# Patient Record
Sex: FEMALE | Race: WHITE | NOT HISPANIC OR LATINO | Employment: OTHER | URBAN - METROPOLITAN AREA
[De-identification: names, ages, dates, MRNs, and addresses within clinical notes are randomized per-mention and may not be internally consistent; named-entity substitution may affect disease eponyms.]

---

## 2022-08-10 ENCOUNTER — HOSPITAL ENCOUNTER (OUTPATIENT)
Facility: MEDICAL CENTER | Age: 77
End: 2022-08-10
Attending: ORTHOPAEDIC SURGERY
Payer: MEDICARE

## 2022-08-10 LAB
APPEARANCE FLD: NORMAL
BODY FLD TYPE: NORMAL
COLOR FLD: NORMAL
GRAM STN SPEC: NORMAL
HISTIOCYTES NFR FLD: 15 %
LYMPHOCYTES NFR FLD: 14 %
MONONUC CELLS NFR FLD: 71 %
RBC # FLD: NORMAL CELLS/UL
SIGNIFICANT IND 70042: NORMAL
SITE SITE: NORMAL
SOURCE SOURCE: NORMAL
WBC # FLD: 3130 CELLS/UL
WBC OTHER NFR FLD: 0 %

## 2022-08-10 PROCEDURE — 89051 BODY FLUID CELL COUNT: CPT

## 2022-08-10 PROCEDURE — 87070 CULTURE OTHR SPECIMN AEROBIC: CPT

## 2022-08-10 PROCEDURE — 87205 SMEAR GRAM STAIN: CPT

## 2022-08-13 LAB
BACTERIA FLD AEROBE CULT: NORMAL
GRAM STN SPEC: NORMAL
SIGNIFICANT IND 70042: NORMAL
SITE SITE: NORMAL
SOURCE SOURCE: NORMAL

## 2023-04-27 PROBLEM — T84.093A FAILED TOTAL LEFT KNEE REPLACEMENT (HCC): Status: ACTIVE | Noted: 2023-04-27

## 2023-04-28 ENCOUNTER — APPOINTMENT (OUTPATIENT)
Dept: ADMISSIONS | Facility: MEDICAL CENTER | Age: 78
DRG: 467 | End: 2023-04-28
Attending: ORTHOPAEDIC SURGERY
Payer: MEDICARE

## 2023-05-17 ENCOUNTER — APPOINTMENT (OUTPATIENT)
Dept: ADMISSIONS | Facility: MEDICAL CENTER | Age: 78
DRG: 467 | End: 2023-05-17
Attending: ORTHOPAEDIC SURGERY
Payer: MEDICARE

## 2023-05-17 VITALS — WEIGHT: 117 LBS | HEIGHT: 67 IN | BODY MASS INDEX: 18.36 KG/M2

## 2023-05-17 ASSESSMENT — FIBROSIS 4 INDEX: FIB4 SCORE: 2.35

## 2023-05-17 NOTE — PREPROCEDURE INSTRUCTIONS
Pt preadmitted via phone, instructions emailed. Questions answered. Pt instructed to continue regularly prescribed meds through day of procedure. Per anesthesia protocol instructed to take these medications the day of procedure- N/A. METs score >4. Placed on fall risk precautions per score.

## 2023-05-18 ENCOUNTER — PRE-ADMISSION TESTING (OUTPATIENT)
Dept: ADMISSIONS | Facility: MEDICAL CENTER | Age: 78
DRG: 467 | End: 2023-05-18
Attending: ORTHOPAEDIC SURGERY
Payer: MEDICARE

## 2023-05-18 DIAGNOSIS — Z01.812 PRE-OPERATIVE LABORATORY EXAMINATION: ICD-10-CM

## 2023-05-18 DIAGNOSIS — Z01.810 PRE-OPERATIVE CARDIOVASCULAR EXAMINATION: ICD-10-CM

## 2023-05-18 DIAGNOSIS — Z01.818 PRE-OP EXAMINATION: ICD-10-CM

## 2023-05-18 LAB
ANION GAP SERPL CALC-SCNC: 12 MMOL/L (ref 7–16)
BUN SERPL-MCNC: 18 MG/DL (ref 8–22)
CALCIUM SERPL-MCNC: 9.5 MG/DL (ref 8.4–10.2)
CHLORIDE SERPL-SCNC: 104 MMOL/L (ref 96–112)
CO2 SERPL-SCNC: 25 MMOL/L (ref 20–33)
CREAT SERPL-MCNC: 0.62 MG/DL (ref 0.5–1.4)
ERYTHROCYTE [DISTWIDTH] IN BLOOD BY AUTOMATED COUNT: 48.6 FL (ref 35.9–50)
EST. AVERAGE GLUCOSE BLD GHB EST-MCNC: 105 MG/DL
GFR SERPLBLD CREATININE-BSD FMLA CKD-EPI: 91 ML/MIN/1.73 M 2
GLUCOSE SERPL-MCNC: 92 MG/DL (ref 65–99)
HBA1C MFR BLD: 5.3 % (ref 4–5.6)
HCT VFR BLD AUTO: 39.8 % (ref 37–47)
HGB BLD-MCNC: 13.5 G/DL (ref 12–16)
MCH RBC QN AUTO: 32.1 PG (ref 27–33)
MCHC RBC AUTO-ENTMCNC: 33.9 G/DL (ref 33.6–35)
MCV RBC AUTO: 94.8 FL (ref 81.4–97.8)
PLATELET # BLD AUTO: 233 K/UL (ref 164–446)
PMV BLD AUTO: 10.1 FL (ref 9–12.9)
POTASSIUM SERPL-SCNC: 3.7 MMOL/L (ref 3.6–5.5)
RBC # BLD AUTO: 4.2 M/UL (ref 4.2–5.4)
SCCMEC + MECA PNL NOSE NAA+PROBE: NEGATIVE
SCCMEC + MECA PNL NOSE NAA+PROBE: NEGATIVE
SODIUM SERPL-SCNC: 141 MMOL/L (ref 135–145)
WBC # BLD AUTO: 5.3 K/UL (ref 4.8–10.8)

## 2023-05-18 PROCEDURE — 36415 COLL VENOUS BLD VENIPUNCTURE: CPT

## 2023-05-18 PROCEDURE — 85027 COMPLETE CBC AUTOMATED: CPT

## 2023-05-18 PROCEDURE — 83036 HEMOGLOBIN GLYCOSYLATED A1C: CPT

## 2023-05-18 PROCEDURE — 80048 BASIC METABOLIC PNL TOTAL CA: CPT

## 2023-05-18 PROCEDURE — 87641 MR-STAPH DNA AMP PROBE: CPT

## 2023-05-18 PROCEDURE — 87640 STAPH A DNA AMP PROBE: CPT | Mod: XU

## 2023-05-18 NOTE — DISCHARGE PLANNING
DISCHARGE PLANNING NOTE - TOTAL JOINT    Procedure: Procedure(s):  LEFT TOTAL KNEE ARTHROPLASTY REVISION ALL COMPONENTS  Procedure Date: 5/22/2023  Insurance: Payor: MEDICARE / Plan: MEDICARE PART A & B / Product Type: *No Product type* /    Equipment currently available at home? Shower chair, ice, fww, toilet seat riser.  Steps into the home? 2  Steps within the home? 0  Toilet height? ADA  Type of shower? walk-in shower  Who will be with you during your recovery? other: one of her neighbors.  Is Outpatient Physical Therapy set up after surgery? Yes in process  Did you take the Total Joint Class and where? Yes received NAON book today.  Planning same day discharge?No     This writer met with pt and her friend, Tiffanie,  during her preadmission appt. Pt educated to preop showers and nasal mrsa swab. Pt prefers to stay over one night. Home safety checklist r given to pt. Pt educated to dc criteria. All questions answered and verbalizes understanding of all instructions. No dc needs identified at this time. Anticipate dc to home without barriers.

## 2023-05-18 NOTE — H&P (VIEW-ONLY)
Sarah Burger is a 77 y.o. female  here today for further discussion of their unstable left knee replacement and left knee pain they have tried and failed most all conservative measures and are ready to move forward with surgery in the form of a revision of the left total knee arthroplasty.  They have obtained the appropriate clearances if necessary.  Patient has been seen and cleared by her primary care provider.  They have pain on a daily basis, pain at night, pain that affects her activities of daily living.  They have no history of blood clots.  They are ready to move forward with surgery.  We are planning on using [employer] triathlon stemmed and constrained-TS as their implants.  They will be having their surgery at [facility].  She had an ESR and CRP done which was within normal limits.    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- goes to [facility]    Pain 05/2023    L knee pain    PONV (postoperative nausea and vomiting)     Type II or unspecified type diabetes mellitus without mention of complication, not stated as uncontrolled     type II, pt states pre     Past Surgical History:   Procedure Laterality Date    PARATHYROIDECTOMY  01/01/2000    [facility]-1 removed    LUMPECTOMY Bilateral 01/01/1994    breast    CATARACT EXTRACTION WITH IOL Bilateral     KNEE ARTHROPLASTY TOTAL Left     KNEE ARTHROTOMY Left     SINUSOTOMIES       Social Connections: Not on file       Current Outpatient Medications:     meloxicam, 15 mg, Oral, DAILY (Patient not taking: Reported on 5/17/2023)    potassium chloride SA, 10 mEq, Oral, DAILY    indapamide, 2.5 mg, Oral, QAM    metFORMIN, 500 mg, Oral, DAILY  Patient has no known allergies.      Past medical history  social history surgical history review of systems reviewed and is otherwise noncontributory except for above    Exam    Alert and oriented x3, no apparent distress  Normocephalic atraumatic, trachea midline  Breathing is nonlabored  Abdomen is nonacute  Neurovascularly intact in bilateral lower extremities.  Soft Calf Compartments without signs of DVT.  Right lower extremity: She has a varus deformity of her right knee.  Range of motion from 5 to 120 degrees.  She has pain with forced flexion and patellofemoral grind, pain with palpation of her tibiofemoral joint line good stability to varus and valgus stress, grossly neurovascularly intact distally  Left lower extremity: Well-healed curvilinear incision consistent with her knee replacement.  She has range of motion from 5 to approximately 115 degrees.  She has some pain with forced flexion, some laxity to valgus stress and pain along the course of her MCL.  She has pain over the lateral facet of her patella.  She is able to plantar and dorsiflex her ankles and extend her great toes.  She has pain and feelings of instability with single leg stance.    Images    All images were reviewed which are appropriate for templating purposes.  They show a cemented posterior stabilized Conformis left total knee arthroplasty with components in expected position and there may be some radiolucencies around the femoral and tibial component consistent with loosening      Impression    1) left knee pain around a Conformis left total knee arthroplasty  2) left knee instability    Plan    All the risk benefits and side effects of surgery were discussed including pain, bleeding, infection, injury to surrounding organs vessels, heart attack, stroke, fracture, dislocation, need for further surgery, and possibly death.  We will plan on going forward with a revision left total knee arthroplasty.  We will plan on using aspirin for DVT prophylaxis.  Patient will get all of their postoperative  prescriptions today.  Patient will plan on going home the following day.  Patient is asked about the option of going to a rehab facility which I have told her we would recommend against if she is able to get around without difficulty.  We will manage and assess her symptoms postoperatively to see if this is necessary.  We will plan on using [place] triathlon stemmed and constrained revision components as their surgical implants.  All of their questions were answered and they agreed expressed full understanding.  We will see them on the day of surgery.  They will get a call from our surgical team about start time and arrival time of surgery.

## 2023-05-19 ENCOUNTER — ANESTHESIA EVENT (OUTPATIENT)
Dept: SURGERY | Facility: MEDICAL CENTER | Age: 78
DRG: 467 | End: 2023-05-19
Payer: MEDICARE

## 2023-05-22 ENCOUNTER — ANESTHESIA (OUTPATIENT)
Dept: SURGERY | Facility: MEDICAL CENTER | Age: 78
DRG: 467 | End: 2023-05-22
Payer: MEDICARE

## 2023-05-22 ENCOUNTER — APPOINTMENT (OUTPATIENT)
Dept: RADIOLOGY | Facility: MEDICAL CENTER | Age: 78
DRG: 467 | End: 2023-05-22
Attending: PHYSICIAN ASSISTANT
Payer: MEDICARE

## 2023-05-22 ENCOUNTER — HOSPITAL ENCOUNTER (INPATIENT)
Facility: MEDICAL CENTER | Age: 78
LOS: 2 days | DRG: 467 | End: 2023-05-24
Attending: ORTHOPAEDIC SURGERY | Admitting: ORTHOPAEDIC SURGERY
Payer: MEDICARE

## 2023-05-22 DIAGNOSIS — T84.093A FAILED TOTAL LEFT KNEE REPLACEMENT, INITIAL ENCOUNTER (HCC): ICD-10-CM

## 2023-05-22 LAB — GLUCOSE BLD STRIP.AUTO-MCNC: 90 MG/DL (ref 65–99)

## 2023-05-22 PROCEDURE — 160035 HCHG PACU - 1ST 60 MINS PHASE I: Performed by: ORTHOPAEDIC SURGERY

## 2023-05-22 PROCEDURE — 700102 HCHG RX REV CODE 250 W/ 637 OVERRIDE(OP): Performed by: ANESTHESIOLOGY

## 2023-05-22 PROCEDURE — 700111 HCHG RX REV CODE 636 W/ 250 OVERRIDE (IP): Performed by: ANESTHESIOLOGY

## 2023-05-22 PROCEDURE — 97607 NEG PRS WND THR NDME<=50SQCM: CPT | Mod: ASROC,LT | Performed by: PHYSICIAN ASSISTANT

## 2023-05-22 PROCEDURE — 160031 HCHG SURGERY MINUTES - 1ST 30 MINS LEVEL 5: Performed by: ORTHOPAEDIC SURGERY

## 2023-05-22 PROCEDURE — 700101 HCHG RX REV CODE 250: Performed by: ORTHOPAEDIC SURGERY

## 2023-05-22 PROCEDURE — 3E0T3BZ INTRODUCTION OF ANESTHETIC AGENT INTO PERIPHERAL NERVES AND PLEXI, PERCUTANEOUS APPROACH: ICD-10-PCS | Performed by: ORTHOPAEDIC SURGERY

## 2023-05-22 PROCEDURE — 700102 HCHG RX REV CODE 250 W/ 637 OVERRIDE(OP): Performed by: PHYSICIAN ASSISTANT

## 2023-05-22 PROCEDURE — 700111 HCHG RX REV CODE 636 W/ 250 OVERRIDE (IP): Performed by: ORTHOPAEDIC SURGERY

## 2023-05-22 PROCEDURE — 64447 NJX AA&/STRD FEMORAL NRV IMG: CPT | Mod: 59,LT | Performed by: ANESTHESIOLOGY

## 2023-05-22 PROCEDURE — 99100 ANES PT EXTEME AGE<1 YR&>70: CPT | Performed by: ANESTHESIOLOGY

## 2023-05-22 PROCEDURE — 73560 X-RAY EXAM OF KNEE 1 OR 2: CPT | Mod: LT

## 2023-05-22 PROCEDURE — 700105 HCHG RX REV CODE 258: Performed by: PHYSICIAN ASSISTANT

## 2023-05-22 PROCEDURE — 27487 REVISE/REPLACE KNEE JOINT: CPT | Mod: LT | Performed by: ORTHOPAEDIC SURGERY

## 2023-05-22 PROCEDURE — 700111 HCHG RX REV CODE 636 W/ 250 OVERRIDE (IP): Performed by: PHYSICIAN ASSISTANT

## 2023-05-22 PROCEDURE — 0SPD0JZ REMOVAL OF SYNTHETIC SUBSTITUTE FROM LEFT KNEE JOINT, OPEN APPROACH: ICD-10-PCS | Performed by: ORTHOPAEDIC SURGERY

## 2023-05-22 PROCEDURE — 01402 ANES OPN/ARTH TOT KNE ARTHRP: CPT | Performed by: ANESTHESIOLOGY

## 2023-05-22 PROCEDURE — 160009 HCHG ANES TIME/MIN: Performed by: ORTHOPAEDIC SURGERY

## 2023-05-22 PROCEDURE — 160042 HCHG SURGERY MINUTES - EA ADDL 1 MIN LEVEL 5: Performed by: ORTHOPAEDIC SURGERY

## 2023-05-22 PROCEDURE — A9270 NON-COVERED ITEM OR SERVICE: HCPCS | Performed by: PHYSICIAN ASSISTANT

## 2023-05-22 PROCEDURE — A9270 NON-COVERED ITEM OR SERVICE: HCPCS | Performed by: ANESTHESIOLOGY

## 2023-05-22 PROCEDURE — 0QBF0ZZ EXCISION OF LEFT PATELLA, OPEN APPROACH: ICD-10-PCS | Performed by: ORTHOPAEDIC SURGERY

## 2023-05-22 PROCEDURE — 160036 HCHG PACU - EA ADDL 30 MINS PHASE I: Performed by: ORTHOPAEDIC SURGERY

## 2023-05-22 PROCEDURE — 0SRD0J9 REPLACEMENT OF LEFT KNEE JOINT WITH SYNTHETIC SUBSTITUTE, CEMENTED, OPEN APPROACH: ICD-10-PCS | Performed by: ORTHOPAEDIC SURGERY

## 2023-05-22 PROCEDURE — C1776 JOINT DEVICE (IMPLANTABLE): HCPCS | Performed by: ORTHOPAEDIC SURGERY

## 2023-05-22 PROCEDURE — 502240 HCHG MISC OR SUPPLY RC 0272: Performed by: ORTHOPAEDIC SURGERY

## 2023-05-22 PROCEDURE — 97607 NEG PRS WND THR NDME<=50SQCM: CPT | Performed by: ORTHOPAEDIC SURGERY

## 2023-05-22 PROCEDURE — 502000 HCHG MISC OR IMPLANTS RC 0278: Performed by: ORTHOPAEDIC SURGERY

## 2023-05-22 PROCEDURE — 770001 HCHG ROOM/CARE - MED/SURG/GYN PRIV*

## 2023-05-22 PROCEDURE — 160002 HCHG RECOVERY MINUTES (STAT): Performed by: ORTHOPAEDIC SURGERY

## 2023-05-22 PROCEDURE — C1713 ANCHOR/SCREW BN/BN,TIS/BN: HCPCS | Performed by: ORTHOPAEDIC SURGERY

## 2023-05-22 PROCEDURE — 27487 REVISE/REPLACE KNEE JOINT: CPT | Mod: ASROC,LT | Performed by: PHYSICIAN ASSISTANT

## 2023-05-22 PROCEDURE — 700101 HCHG RX REV CODE 250: Performed by: ANESTHESIOLOGY

## 2023-05-22 PROCEDURE — 700105 HCHG RX REV CODE 258: Performed by: ORTHOPAEDIC SURGERY

## 2023-05-22 PROCEDURE — 94760 N-INVAS EAR/PLS OXIMETRY 1: CPT

## 2023-05-22 PROCEDURE — 64447 NJX AA&/STRD FEMORAL NRV IMG: CPT | Performed by: ORTHOPAEDIC SURGERY

## 2023-05-22 PROCEDURE — 160048 HCHG OR STATISTICAL LEVEL 1-5: Performed by: ORTHOPAEDIC SURGERY

## 2023-05-22 PROCEDURE — 82962 GLUCOSE BLOOD TEST: CPT

## 2023-05-22 DEVICE — CEMENT ORTHOPEDIC HV US  (10/PK): Type: IMPLANTABLE DEVICE | Site: KNEE | Status: FUNCTIONAL

## 2023-05-22 DEVICE — IMPLANTABLE DEVICE: Type: IMPLANTABLE DEVICE | Site: KNEE | Status: FUNCTIONAL

## 2023-05-22 RX ORDER — ONDANSETRON 2 MG/ML
4 INJECTION INTRAMUSCULAR; INTRAVENOUS EVERY 4 HOURS PRN
Status: DISCONTINUED | OUTPATIENT
Start: 2023-05-22 | End: 2023-05-24 | Stop reason: HOSPADM

## 2023-05-22 RX ORDER — DEXAMETHASONE SODIUM PHOSPHATE 4 MG/ML
INJECTION, SOLUTION INTRA-ARTICULAR; INTRALESIONAL; INTRAMUSCULAR; INTRAVENOUS; SOFT TISSUE PRN
Status: DISCONTINUED | OUTPATIENT
Start: 2023-05-22 | End: 2023-05-22 | Stop reason: SURG

## 2023-05-22 RX ORDER — SODIUM CHLORIDE 9 MG/ML
INJECTION, SOLUTION INTRAMUSCULAR; INTRAVENOUS; SUBCUTANEOUS
Status: DISCONTINUED | OUTPATIENT
Start: 2023-05-22 | End: 2023-05-22 | Stop reason: HOSPADM

## 2023-05-22 RX ORDER — ACETAMINOPHEN 325 MG/1
650 TABLET ORAL EVERY 6 HOURS PRN
Status: DISCONTINUED | OUTPATIENT
Start: 2023-05-27 | End: 2023-05-24 | Stop reason: HOSPADM

## 2023-05-22 RX ORDER — DIPHENHYDRAMINE HYDROCHLORIDE 50 MG/ML
12.5 INJECTION INTRAMUSCULAR; INTRAVENOUS
Status: DISCONTINUED | OUTPATIENT
Start: 2023-05-22 | End: 2023-05-22 | Stop reason: HOSPADM

## 2023-05-22 RX ORDER — LIDOCAINE HYDROCHLORIDE 20 MG/ML
INJECTION, SOLUTION EPIDURAL; INFILTRATION; INTRACAUDAL; PERINEURAL PRN
Status: DISCONTINUED | OUTPATIENT
Start: 2023-05-22 | End: 2023-05-22 | Stop reason: SURG

## 2023-05-22 RX ORDER — HYDROMORPHONE HYDROCHLORIDE 1 MG/ML
0.1 INJECTION, SOLUTION INTRAMUSCULAR; INTRAVENOUS; SUBCUTANEOUS
Status: DISCONTINUED | OUTPATIENT
Start: 2023-05-22 | End: 2023-05-22 | Stop reason: HOSPADM

## 2023-05-22 RX ORDER — DIPHENHYDRAMINE HCL 25 MG
25 TABLET ORAL NIGHTLY PRN
Status: DISCONTINUED | OUTPATIENT
Start: 2023-05-23 | End: 2023-05-24 | Stop reason: HOSPADM

## 2023-05-22 RX ORDER — IBUPROFEN 400 MG/1
400 TABLET ORAL 3 TIMES DAILY PRN
Status: DISCONTINUED | OUTPATIENT
Start: 2023-05-25 | End: 2023-05-24 | Stop reason: HOSPADM

## 2023-05-22 RX ORDER — SODIUM CHLORIDE, SODIUM LACTATE, POTASSIUM CHLORIDE, CALCIUM CHLORIDE 600; 310; 30; 20 MG/100ML; MG/100ML; MG/100ML; MG/100ML
INJECTION, SOLUTION INTRAVENOUS CONTINUOUS
Status: ACTIVE | OUTPATIENT
Start: 2023-05-22 | End: 2023-05-22

## 2023-05-22 RX ORDER — ROCURONIUM BROMIDE 10 MG/ML
INJECTION, SOLUTION INTRAVENOUS PRN
Status: DISCONTINUED | OUTPATIENT
Start: 2023-05-22 | End: 2023-05-22 | Stop reason: SURG

## 2023-05-22 RX ORDER — DEXTROSE MONOHYDRATE, SODIUM CHLORIDE, AND POTASSIUM CHLORIDE 50; 1.49; 4.5 G/1000ML; G/1000ML; G/1000ML
INJECTION, SOLUTION INTRAVENOUS CONTINUOUS
Status: ACTIVE | OUTPATIENT
Start: 2023-05-22 | End: 2023-05-22

## 2023-05-22 RX ORDER — TRANEXAMIC ACID 100 MG/ML
1000 INJECTION, SOLUTION INTRAVENOUS ONCE
Status: DISCONTINUED | OUTPATIENT
Start: 2023-05-22 | End: 2023-05-22 | Stop reason: HOSPADM

## 2023-05-22 RX ORDER — HYDROMORPHONE HYDROCHLORIDE 2 MG/ML
INJECTION, SOLUTION INTRAMUSCULAR; INTRAVENOUS; SUBCUTANEOUS PRN
Status: DISCONTINUED | OUTPATIENT
Start: 2023-05-22 | End: 2023-05-22 | Stop reason: SURG

## 2023-05-22 RX ORDER — BISACODYL 10 MG
10 SUPPOSITORY, RECTAL RECTAL
Status: DISCONTINUED | OUTPATIENT
Start: 2023-05-22 | End: 2023-05-24 | Stop reason: HOSPADM

## 2023-05-22 RX ORDER — DIPHENHYDRAMINE HCL 25 MG
25 TABLET ORAL EVERY 6 HOURS PRN
Status: DISCONTINUED | OUTPATIENT
Start: 2023-05-22 | End: 2023-05-24 | Stop reason: HOSPADM

## 2023-05-22 RX ORDER — INDAPAMIDE 2.5 MG/1
2.5 TABLET ORAL EVERY MORNING
Status: DISCONTINUED | OUTPATIENT
Start: 2023-05-23 | End: 2023-05-24 | Stop reason: HOSPADM

## 2023-05-22 RX ORDER — SCOLOPAMINE TRANSDERMAL SYSTEM 1 MG/1
1 PATCH, EXTENDED RELEASE TRANSDERMAL
Status: DISCONTINUED | OUTPATIENT
Start: 2023-05-22 | End: 2023-05-24 | Stop reason: HOSPADM

## 2023-05-22 RX ORDER — TRANEXAMIC ACID 100 MG/ML
INJECTION, SOLUTION INTRAVENOUS PRN
Status: DISCONTINUED | OUTPATIENT
Start: 2023-05-22 | End: 2023-05-22 | Stop reason: SURG

## 2023-05-22 RX ORDER — ONDANSETRON 4 MG/1
4 TABLET, ORALLY DISINTEGRATING ORAL EVERY 6 HOURS PRN
Status: DISCONTINUED | OUTPATIENT
Start: 2023-05-22 | End: 2023-05-24 | Stop reason: HOSPADM

## 2023-05-22 RX ORDER — VANCOMYCIN HYDROCHLORIDE 1 G/20ML
INJECTION, POWDER, LYOPHILIZED, FOR SOLUTION INTRAVENOUS
Status: COMPLETED | OUTPATIENT
Start: 2023-05-22 | End: 2023-05-22

## 2023-05-22 RX ORDER — CEFAZOLIN SODIUM 1 G/3ML
INJECTION, POWDER, FOR SOLUTION INTRAMUSCULAR; INTRAVENOUS PRN
Status: DISCONTINUED | OUTPATIENT
Start: 2023-05-22 | End: 2023-05-22 | Stop reason: SURG

## 2023-05-22 RX ORDER — DEXAMETHASONE SODIUM PHOSPHATE 4 MG/ML
10 INJECTION, SOLUTION INTRA-ARTICULAR; INTRALESIONAL; INTRAMUSCULAR; INTRAVENOUS; SOFT TISSUE ONCE
Status: COMPLETED | OUTPATIENT
Start: 2023-05-23 | End: 2023-05-23

## 2023-05-22 RX ORDER — OXYCODONE HCL 5 MG/5 ML
5 SOLUTION, ORAL ORAL
Status: DISCONTINUED | OUTPATIENT
Start: 2023-05-22 | End: 2023-05-22 | Stop reason: HOSPADM

## 2023-05-22 RX ORDER — ENEMA 19; 7 G/133ML; G/133ML
1 ENEMA RECTAL
Status: DISCONTINUED | OUTPATIENT
Start: 2023-05-22 | End: 2023-05-24 | Stop reason: HOSPADM

## 2023-05-22 RX ORDER — TRAMADOL HYDROCHLORIDE 50 MG/1
50 TABLET ORAL EVERY 4 HOURS PRN
Status: DISCONTINUED | OUTPATIENT
Start: 2023-05-22 | End: 2023-05-24 | Stop reason: HOSPADM

## 2023-05-22 RX ORDER — AMOXICILLIN 250 MG
1 CAPSULE ORAL NIGHTLY
Status: DISCONTINUED | OUTPATIENT
Start: 2023-05-22 | End: 2023-05-24 | Stop reason: HOSPADM

## 2023-05-22 RX ORDER — HYDROMORPHONE HYDROCHLORIDE 1 MG/ML
0.2 INJECTION, SOLUTION INTRAMUSCULAR; INTRAVENOUS; SUBCUTANEOUS
Status: DISCONTINUED | OUTPATIENT
Start: 2023-05-22 | End: 2023-05-22 | Stop reason: HOSPADM

## 2023-05-22 RX ORDER — TAMSULOSIN HYDROCHLORIDE 0.4 MG/1
0.4 CAPSULE ORAL
Status: DISCONTINUED | OUTPATIENT
Start: 2023-05-22 | End: 2023-05-23

## 2023-05-22 RX ORDER — CEFAZOLIN SODIUM 1 G/3ML
2 INJECTION, POWDER, FOR SOLUTION INTRAMUSCULAR; INTRAVENOUS ONCE
Status: DISCONTINUED | OUTPATIENT
Start: 2023-05-22 | End: 2023-05-22 | Stop reason: HOSPADM

## 2023-05-22 RX ORDER — ACETAMINOPHEN 325 MG/1
650 TABLET ORAL EVERY 6 HOURS
Status: DISCONTINUED | OUTPATIENT
Start: 2023-05-22 | End: 2023-05-24 | Stop reason: HOSPADM

## 2023-05-22 RX ORDER — ROPIVACAINE HYDROCHLORIDE 5 MG/ML
INJECTION, SOLUTION EPIDURAL; INFILTRATION; PERINEURAL
Status: DISCONTINUED | OUTPATIENT
Start: 2023-05-22 | End: 2023-05-22 | Stop reason: HOSPADM

## 2023-05-22 RX ORDER — SODIUM CHLORIDE, SODIUM LACTATE, POTASSIUM CHLORIDE, CALCIUM CHLORIDE 600; 310; 30; 20 MG/100ML; MG/100ML; MG/100ML; MG/100ML
INJECTION, SOLUTION INTRAVENOUS CONTINUOUS
Status: DISCONTINUED | OUTPATIENT
Start: 2023-05-22 | End: 2023-05-22 | Stop reason: HOSPADM

## 2023-05-22 RX ORDER — DIPHENHYDRAMINE HYDROCHLORIDE 50 MG/ML
25 INJECTION INTRAMUSCULAR; INTRAVENOUS EVERY 6 HOURS PRN
Status: DISCONTINUED | OUTPATIENT
Start: 2023-05-22 | End: 2023-05-24 | Stop reason: HOSPADM

## 2023-05-22 RX ORDER — POLYETHYLENE GLYCOL 3350 17 G/17G
1 POWDER, FOR SOLUTION ORAL 2 TIMES DAILY PRN
Status: DISCONTINUED | OUTPATIENT
Start: 2023-05-22 | End: 2023-05-24 | Stop reason: HOSPADM

## 2023-05-22 RX ORDER — ONDANSETRON 2 MG/ML
INJECTION INTRAMUSCULAR; INTRAVENOUS PRN
Status: DISCONTINUED | OUTPATIENT
Start: 2023-05-22 | End: 2023-05-22 | Stop reason: SURG

## 2023-05-22 RX ORDER — ONDANSETRON 2 MG/ML
4 INJECTION INTRAMUSCULAR; INTRAVENOUS
Status: COMPLETED | OUTPATIENT
Start: 2023-05-22 | End: 2023-05-22

## 2023-05-22 RX ORDER — OMEPRAZOLE 20 MG/1
20 CAPSULE, DELAYED RELEASE ORAL 2 TIMES DAILY PRN
Status: DISCONTINUED | OUTPATIENT
Start: 2023-05-22 | End: 2023-05-24 | Stop reason: HOSPADM

## 2023-05-22 RX ORDER — AMOXICILLIN 250 MG
1 CAPSULE ORAL
Status: DISCONTINUED | OUTPATIENT
Start: 2023-05-22 | End: 2023-05-24 | Stop reason: HOSPADM

## 2023-05-22 RX ORDER — ACETAMINOPHEN 325 MG/1
650 TABLET ORAL ONCE
Status: COMPLETED | OUTPATIENT
Start: 2023-05-22 | End: 2023-05-22

## 2023-05-22 RX ORDER — ASPIRIN 81 MG/1
81 TABLET ORAL 2 TIMES DAILY
Status: DISCONTINUED | OUTPATIENT
Start: 2023-05-23 | End: 2023-05-24 | Stop reason: HOSPADM

## 2023-05-22 RX ORDER — HALOPERIDOL 5 MG/ML
1 INJECTION INTRAMUSCULAR EVERY 6 HOURS PRN
Status: DISCONTINUED | OUTPATIENT
Start: 2023-05-22 | End: 2023-05-24 | Stop reason: HOSPADM

## 2023-05-22 RX ORDER — OXYCODONE HCL 5 MG/5 ML
10 SOLUTION, ORAL ORAL
Status: DISCONTINUED | OUTPATIENT
Start: 2023-05-22 | End: 2023-05-22 | Stop reason: HOSPADM

## 2023-05-22 RX ORDER — KETOROLAC TROMETHAMINE 30 MG/ML
15 INJECTION, SOLUTION INTRAMUSCULAR; INTRAVENOUS EVERY 6 HOURS
Status: DISCONTINUED | OUTPATIENT
Start: 2023-05-22 | End: 2023-05-24 | Stop reason: HOSPADM

## 2023-05-22 RX ORDER — ROPIVACAINE HYDROCHLORIDE 5 MG/ML
INJECTION, SOLUTION EPIDURAL; INFILTRATION; PERINEURAL PRN
Status: DISCONTINUED | OUTPATIENT
Start: 2023-05-22 | End: 2023-05-22 | Stop reason: SURG

## 2023-05-22 RX ORDER — DOCUSATE SODIUM 100 MG/1
100 CAPSULE, LIQUID FILLED ORAL 2 TIMES DAILY
Status: DISCONTINUED | OUTPATIENT
Start: 2023-05-22 | End: 2023-05-24 | Stop reason: HOSPADM

## 2023-05-22 RX ORDER — HYDROMORPHONE HYDROCHLORIDE 1 MG/ML
0.4 INJECTION, SOLUTION INTRAMUSCULAR; INTRAVENOUS; SUBCUTANEOUS
Status: DISCONTINUED | OUTPATIENT
Start: 2023-05-22 | End: 2023-05-22 | Stop reason: HOSPADM

## 2023-05-22 RX ORDER — DEXAMETHASONE SODIUM PHOSPHATE 4 MG/ML
4 INJECTION, SOLUTION INTRA-ARTICULAR; INTRALESIONAL; INTRAMUSCULAR; INTRAVENOUS; SOFT TISSUE
Status: COMPLETED | OUTPATIENT
Start: 2023-05-22 | End: 2023-05-22

## 2023-05-22 RX ORDER — EPINEPHRINE 1 MG/ML(1)
AMPUL (ML) INJECTION
Status: DISCONTINUED | OUTPATIENT
Start: 2023-05-22 | End: 2023-05-22 | Stop reason: HOSPADM

## 2023-05-22 RX ADMIN — HYDROMORPHONE HYDROCHLORIDE 1 MG: 2 INJECTION INTRAMUSCULAR; INTRAVENOUS; SUBCUTANEOUS at 13:08

## 2023-05-22 RX ADMIN — CEFAZOLIN 2 G: 1 INJECTION, POWDER, FOR SOLUTION INTRAMUSCULAR; INTRAVENOUS at 13:16

## 2023-05-22 RX ADMIN — ACETAMINOPHEN 650 MG: 325 TABLET ORAL at 12:29

## 2023-05-22 RX ADMIN — DEXAMETHASONE SODIUM PHOSPHATE 8 MG: 4 INJECTION INTRA-ARTICULAR; INTRALESIONAL; INTRAMUSCULAR; INTRAVENOUS; SOFT TISSUE at 13:17

## 2023-05-22 RX ADMIN — SUGAMMADEX 200 MG: 100 INJECTION, SOLUTION INTRAVENOUS at 14:28

## 2023-05-22 RX ADMIN — ONDANSETRON 8 MG: 2 INJECTION INTRAMUSCULAR; INTRAVENOUS at 14:21

## 2023-05-22 RX ADMIN — VANCOMYCIN HYDROCHLORIDE 1000 MG: 1 INJECTION, POWDER, LYOPHILIZED, FOR SOLUTION INTRAVENOUS at 14:12

## 2023-05-22 RX ADMIN — CEFAZOLIN 2 G: 2 INJECTION, POWDER, FOR SOLUTION INTRAMUSCULAR; INTRAVENOUS at 18:15

## 2023-05-22 RX ADMIN — ROPIVACAINE HYDROCHLORIDE 15 ML: 5 INJECTION EPIDURAL; INFILTRATION; PERINEURAL at 12:37

## 2023-05-22 RX ADMIN — ONDANSETRON 4 MG: 2 INJECTION INTRAMUSCULAR; INTRAVENOUS at 18:10

## 2023-05-22 RX ADMIN — TRANEXAMIC ACID 1000 MG: 100 INJECTION, SOLUTION INTRAVENOUS at 13:17

## 2023-05-22 RX ADMIN — TRANEXAMIC ACID 1000 MG: 100 INJECTION, SOLUTION INTRAVENOUS at 14:14

## 2023-05-22 RX ADMIN — ONDANSETRON 4 MG: 2 INJECTION INTRAMUSCULAR; INTRAVENOUS at 23:47

## 2023-05-22 RX ADMIN — ACETAMINOPHEN 650 MG: 325 TABLET ORAL at 20:14

## 2023-05-22 RX ADMIN — LIDOCAINE HYDROCHLORIDE 3 ML: 20 INJECTION, SOLUTION EPIDURAL; INFILTRATION; INTRACAUDAL at 12:36

## 2023-05-22 RX ADMIN — HYDROMORPHONE HYDROCHLORIDE 1 MG: 2 INJECTION INTRAMUSCULAR; INTRAVENOUS; SUBCUTANEOUS at 13:29

## 2023-05-22 RX ADMIN — TAMSULOSIN HYDROCHLORIDE 0.4 MG: 0.4 CAPSULE ORAL at 20:15

## 2023-05-22 RX ADMIN — ROCURONIUM BROMIDE 50 MG: 50 INJECTION, SOLUTION INTRAVENOUS at 13:12

## 2023-05-22 RX ADMIN — SODIUM CHLORIDE, POTASSIUM CHLORIDE, SODIUM LACTATE AND CALCIUM CHLORIDE: 600; 310; 30; 20 INJECTION, SOLUTION INTRAVENOUS at 12:29

## 2023-05-22 RX ADMIN — PROPOFOL 150 MG: 10 INJECTION, EMULSION INTRAVENOUS at 13:11

## 2023-05-22 RX ADMIN — DEXAMETHASONE SODIUM PHOSPHATE 4 MG: 4 INJECTION, SOLUTION INTRA-ARTICULAR; INTRALESIONAL; INTRAMUSCULAR; INTRAVENOUS; SOFT TISSUE at 20:15

## 2023-05-22 RX ADMIN — KETOROLAC TROMETHAMINE 15 MG: 30 INJECTION, SOLUTION INTRAMUSCULAR at 20:15

## 2023-05-22 ASSESSMENT — LIFESTYLE VARIABLES
TOTAL SCORE: 0
HOW MANY TIMES IN THE PAST YEAR HAVE YOU HAD 5 OR MORE DRINKS IN A DAY: 0
TOTAL SCORE: 0
EVER FELT BAD OR GUILTY ABOUT YOUR DRINKING: NO
HAVE YOU EVER FELT YOU SHOULD CUT DOWN ON YOUR DRINKING: NO
HAVE PEOPLE ANNOYED YOU BY CRITICIZING YOUR DRINKING: NO
ALCOHOL_USE: YES
TOTAL SCORE: 0
ON A TYPICAL DAY WHEN YOU DRINK ALCOHOL HOW MANY DRINKS DO YOU HAVE: 1
EVER HAD A DRINK FIRST THING IN THE MORNING TO STEADY YOUR NERVES TO GET RID OF A HANGOVER: NO
CONSUMPTION TOTAL: NEGATIVE
AVERAGE NUMBER OF DAYS PER WEEK YOU HAVE A DRINK CONTAINING ALCOHOL: 5

## 2023-05-22 ASSESSMENT — FIBROSIS 4 INDEX
FIB4 SCORE: 2.12
FIB4 SCORE: 2.12

## 2023-05-22 ASSESSMENT — PATIENT HEALTH QUESTIONNAIRE - PHQ9
1. LITTLE INTEREST OR PLEASURE IN DOING THINGS: NOT AT ALL
2. FEELING DOWN, DEPRESSED, IRRITABLE, OR HOPELESS: NOT AT ALL
SUM OF ALL RESPONSES TO PHQ9 QUESTIONS 1 AND 2: 0

## 2023-05-22 ASSESSMENT — PAIN DESCRIPTION - PAIN TYPE
TYPE: SURGICAL PAIN
TYPE: CHRONIC PAIN

## 2023-05-22 ASSESSMENT — PAIN SCALES - GENERAL: PAIN_LEVEL: 0

## 2023-05-22 NOTE — ANESTHESIA PROCEDURE NOTES
Peripheral Block    Date/Time: 5/22/2023 12:35 PM    Performed by: Rahat Alexander M.D.  Authorized by: Rahat Alexander M.D.    Start Time:  5/22/2023 12:35 PM  Reason for Block: at surgeon's request and post-op pain management ONLY    patient identified, IV checked, site marked, risks and benefits discussed, surgical consent, monitors and equipment checked, pre-op evaluation and timeout performed    Patient Position:  Supine  Prep: ChloraPrep    Monitoring:  Heart rate, continuous pulse ox and cardiac monitor  Block Region:  Lower Extremity  Lower Extremity - Block Type:  Selective FEMORAL nerve block at the Adductor Canal    Laterality:  Left  Procedures: ultrasound guided  Image captured, interpreted and electronically stored.  Local Infiltration:  Lidocaine  Strength:  1 %  Dose:  3 ml  Block Type:  Single-shot  Needle Localization:  Ultrasound guidance  Injection Assessment:  Negative aspiration for heme, no paresthesia on injection, incremental injection and local visualized surrounding nerve on ultrasound

## 2023-05-22 NOTE — OP REPORT
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  A large lateral osteophyte was removed from the patella  Fixation: 2 packages Simplex HV    Surgeon:  Anastacio De La Cruz MD    1st Assistant:   Stanislaw Washington PA-C    Anesthesiologist:   Duc Alexander DO    EBL:  100 cc    Specimen:  none    Indications for procedure:  This patient is a 77 y.o. female who had undergone a Conformis left knee replacement in the past.  She had feelings of instability and pain with ambulation.  An infection work-up was negative.  Extrinsic bracing did make her feel better.  Radiographic appearance was of a loose femoral component.  She was indicated for a revision left total knee replacement. The patient expressed an understanding of the risks of pain, bleeding, infection, dislocation, malalignment, need for further surgery, damage to surrounding structures, neurovascular compromise, blood clots, leg-length discrepancy both apparent and actual, anesthetic complications, and serious medical consequences including but not limited to heart attack, stroke or even death. It was explained that the implants are man-made products and thus subject to possible failure.  The patient expressed an understanding and wished to proceed. Specific risks based on the patient's medical history were addressed. A clearance was obtained by  the medical physicians and the patient was taken to the operating room on the day of surgery for the above named procedure.     Description of procedure:  The patient was met in the pre-operative holding area. The left knee was marked as the appropriate surgical site with indelible ink. They were taken to the operating room where the anesthesia department started a adductor/general for intraoperative and post-operative anesthesia and pain control. The patient was placed on the OR table and a tourniquet was placed high on the operative thigh. All bony prominences were padded appropriately. The operative knee was prepped and draped in a standard sterile surgical fashion. A time out procedure was called to verify the side and site of surgery, the proposed surgical procedure, and the administration of pre-operative antibiotics. After successful completion of the time out procedure, our attention was turned to the left knee.  The tourniquet was inflated after exsanguination with an Esmarch bandage. The knee was flexed and her previous curvilinear medial based incision was reentered. The capsule of the knee was cleared and a medial parapatellar arthrotomy was performed. The patella was subluxated laterally and a medial release was performed to properly visualize the posteromedial aspect of the tibial plateau. The knee was extended, the patellar tendon was protected and the remnants of the infrapatellar fat pad were excised. A lateral release was performed.  Medial and lateral gutters were reestablished.  The patella was examined, showed no signs of wear and was well fixed.  We did remove a large lateral osteophyte.  A suprapatellar synovectomy was performed.    Now we set about removal of her previous components.  The femoral component was loose and had anterior cortical resorption.  We were able to use the ACL blade and the mini power to remove this component with relatively little bone loss.  We exposed the tibia  circumferentially, retracted the femur posteriorly.  We used the oscillating saw and the technique of stacked osteotomes to remove the tibial component.  Now with the components out we thoroughly irrigated the wound, the tourniquet was put down and hemostasis was obtained.  We set about our reconstruction.  We started with the tibia.  We reamed up to a size 15.  We made a skin cut across the tibia, there was a central defect, but it was small enough to be filled with the universal tibial baseplate.  We sized the tibia to be a size 4 and the appropriate trial tibia was constructed and inserted into the bone to act as a protector.  Now we turned our attention to the femur  We again reamed the femur up to a size 20.  We made a skin cut across the distal femur to reestablish his distal valgus joint line.  We sized the femur using the CIQUAL sizing guide to be a size 4.  The size 4 cut block was pinned in place in the proper amount of external rotation.  We made our cuts, noted a grand piano sign anteriorly.  We cut the box for the TS post in the standard fashion.  Now we constructed a trial knee.  We did trial both the 16 in the 19 mm TS polyethylene's.  With the 19 mm polyethylene we noted full extension without recurvatum and greater than 130 degrees of flexion with appropriate patellar tracking. At this point we removed the trial components and thoroughly irrigated the wound. Osteophytes were removed. The tourniquet was reinflated.  The real components were opened in a sterile fashion on the back table and assembled using the torque wrench and then cemented into place sequentially, first the tibia, then the femur. The cement was allowed to cure with the trial polyethylene component in place. After the cement had hardened and all excess cement was removed, the knee was taken through a range of motion. Again we noted full extension and greater than 125 degrees of flexion with appropriate varus/valgus stability and  patellar tracking. Therefore the real polyethylene was opened and inserted into the tibial tray. An audible click was heard as it engaged the tibia.  The pin for the TS post was inserted in the standard fashion. Now a dilute betadine solution was instilled into the knee for 3 minutes before being pulse-lavaged out. The tourniquet was again released and hemostasis was obtained. The knee was flexed, the arthrotomy was closed with #1 Quill, followed by 2-0 Vicryl in the deep dermal layer in a buried interrupted fashion. The skin was closed with staples, and a sterile Negative Pressure wound dressing was applied. The patient is currently in the operating room awaiting reversal of anesthesia. All sponge and instrument counts were correct at the end of the case.

## 2023-05-22 NOTE — ANESTHESIA TIME REPORT
Anesthesia Start and Stop Event Times     Date Time Event    5/22/2023 1240 Ready for Procedure     1307 Anesthesia Start     1439 Anesthesia Stop        Responsible Staff  05/22/23    Name Role Begin End    Rahat Alexander M.D. Anesth 1307 1434        Overtime Reason:  no overtime (within assigned shift)    Comments:

## 2023-05-22 NOTE — ANESTHESIA POSTPROCEDURE EVALUATION
Patient: Sarah Burger    Procedure Summary     Date: 05/22/23 Room / Location:  OR 03 / SURGERY [facility]    Anesthesia Start: 1307 Anesthesia Stop: 1439    Procedure: LEFT TOTAL KNEE ARTHROPLASTY REVISION ALL COMPONENTS (Left: Knee) Diagnosis:       Failed total left knee replacement (HCC)      (Failed total left knee replacement (HCC) [T84.093A])    Surgeons: Anastacio De La Cruz M.D. Responsible Provider: Rhaat Alexander M.D.    Anesthesia Type: general, peripheral nerve block ASA Status: 2          Final Anesthesia Type: general, peripheral nerve block  Last vitals  BP   Blood Pressure : 135/72    Temp   36.6 °C (97.9 °F)    Pulse   62   Resp   16    SpO2   99 %      Anesthesia Post Evaluation    Patient location during evaluation: PACU  Patient participation: complete - patient participated  Level of consciousness: awake and alert  Pain score: 0    Airway patency: patent  Anesthetic complications: no  Cardiovascular status: hemodynamically stable  Respiratory status: acceptable  Hydration status: euvolemic    PONV: none          No notable events documented.     Nurse Pain Score: 5 (NPRS)

## 2023-05-22 NOTE — ANESTHESIA PROCEDURE NOTES
Airway    Date/Time: 5/22/2023 1:13 PM    Performed by: Rahat Alexander M.D.  Authorized by: Rahat Alexander M.D.    Location:  OR  Urgency:  Elective  Indications for Airway Management:  Anesthesia      Spontaneous Ventilation: absent    Sedation Level:  Deep  Preoxygenated: Yes    Patient Position:  Sniffing  Mask Difficulty Assessment:  1 - vent by mask  Final Airway Type:  Endotracheal airway  Final Endotracheal Airway:  ETT  Cuffed: Yes    Technique Used for Successful ETT Placement:  Direct laryngoscopy    Insertion Site:  Oral  Blade Type:  Glide  Laryngoscope Blade/Videolaryngoscope Blade Size:  3  ETT Size (mm):  7.0  Measured from:  Teeth  ETT to Teeth (cm):  21  Placement Verified by: auscultation and capnometry    Cormack-Lehane Classification:  Grade I - full view of glottis  Number of Attempts at Approach:  1   No teeth contact, ATET

## 2023-05-22 NOTE — FACE TO FACE
Face to Face Note  -  Durable Medical Equipment    Stanislaw Washington II, P.EVELYN-THO. - NPI: [id number]  I certify that this patient is under my care and that they have had a durable medical equipment(DME)face to face encounter by myself that meets the physician DME face-to-face encounter requirements with this patient on:    Date of encounter:   Patient:                    MRN:                       YOB: 2024  Sarah Burger  [id number]  1945     The encounter with the patient was in whole, or in part, for the following medical condition, which is the primary reason for durable medical equipment:  Post-Op Surgery    I certify that, based on my findings, the following durable medical equipment is medically necessary:  Walkers.        ------------------------------------------------------------------------------------------------------------------    Face to Face Supporting Documentation - Home Health    The encounter with this patient was in whole or in part the primary reason for home health admission.    Date of encounter:   Patient:                    MRN:                       YOB: 2024  Sarah Burger  [id number]  1945     Home health to see patient for:  Home health aide and Physical Therapy evaluation and treatment    Skilled need for:  Surgical Aftercare S/P Revision TKA    Skilled nursing interventions to include:  Comment: S/P Revision TKA    Homebound evidenced status by:  Need the aid of supportive devices such as crutches, canes, wheelchairs or walkers. Leaving home must require a considerable and taxing effort. There must exist a normal inability to leave the home.    Community Physician to provide follow up care: Woodrow Aldrich D.O.     Optional Interventions    Wound information & treatment:    Home Infusion Therapy orders:    Line/Drain/Airway:    I certify the face to face encounter for this home care referral meets the CMS requirements and the  encounter/clinical assessment with the patient was, in whole, or in part, for the medical condition(s) listed above, which is the primary reason for home health care. Based on my clinical findings: the service(s) are medically necessary, support the need for home health care, and the homebound criteria are met.  I certify that this patient has had a face to face encounter by myself.  Stanislaw Washington II, P.A.-C. - NPI: [id number]    *Debility, frailty and advanced age in the absence of an acute deterioration or exacerbation of a condition do not qualify a patient for home health.

## 2023-05-22 NOTE — ANESTHESIA PREPROCEDURE EVALUATION
Case: [id number] Date/Time: 05/22/23 1245    Procedure: LEFT TOTAL KNEE ARTHROPLASTY REVISION ALL COMPONENTS    Diagnosis: Failed total left knee replacement (HCC) [T84.093A]    Pre-op diagnosis: Failed total left knee replacement (HCC) [T84.093A]    Location: [place] [address] / SURGERY [facility]    Surgeons: Anastacio De La Cruz M.D.          Relevant Problems   No relevant active problems       Physical Exam    Airway   Mallampati: II  TM distance: >3 FB  Neck ROM: full       Cardiovascular - normal exam  Rhythm: regular  Rate: normal  (-) murmur     Dental - normal exam           Pulmonary - normal exam  Breath sounds clear to auscultation     Abdominal    Neurological - normal exam                 Anesthesia Plan    ASA 2       Plan - general and peripheral nerve block     Peripheral nerve block will be post-op pain control  Airway plan will be ETT          Induction: intravenous    Postoperative Plan: Postoperative administration of opioids is intended.    Pertinent diagnostic labs and testing reviewed    Informed Consent:    Anesthetic plan and risks discussed with patient.    Use of blood products discussed with: patient whom consented to blood products.

## 2023-05-22 NOTE — LETTER
April 27, 2023    Patient Name: Sarah Burger  Surgeon Name: Anastacio De La Cruz M.D.  Surgery Facility[facility] [facility]  Surgery Date: 5/22/2023    The time of your surgery is not final and may change up to and until the day of your surgery. You will be contacted 24-48 hours prior to your surgery date with your check-in and surgery time.    If you will not be at one of the below numbers please call the surgery scheduler at [phone]  Preferred Phone: [phone]    BEFORE YOUR SURGERY   Pre Registration and/or Lab Work must be done within and no earlier than 28 days prior to your surgery date. Please call [facility] at [phone] for an appointment as soon as possible.    Pre op Appointment:   Date: 5/18/2023   Time: 9:30AM   Provider: Anastacio De La Cruz MD   Location: [address] [place] [id number]  Instructions: Bring a list of all medications you are taking including the dosing and frequency.    - Please  your non-narcotic prescriptions prior to surgery at the pharmacy you provided us. DON'T START them until after your surgery.    DAY OF YOUR SURGERY  Nothing to eat or drink after midnight     Refrain from smoking any substance after midnight prior to surgery. Smoking may interfere with the anesthetic and frequently produces nausea during the recovery period.    Continue taking all lifesaving medications. Including the morning of your surgery with small sip of water.    Please do NOT take on the day of surgery:  Diuretics: examples- furosemide (Lasix), spironolactone, hydrochlorothiazide  ACE-inhibitors: examples- lisinopril, ramipril, enalapril  “ARBs”: examples- losartan, Olmesartan, valsartan    Please arrive at the hospital/surgery center at the check-in time provided.     An adult will need to bring you and take you home after your surgery.     AFTER YOUR SURGERY  Post op Appointment:   Date: 5/30/2023   Time:  9:30AM   With: Jennifer Steven PA-C   Location: [address] [place] [id number]    - Therapy- Your first appointment should be 1  week(s) after your surgery. For your convenience we have 4 Physical Therapy locations: [place], [facility], [place], and [facility]. Call our office ASAP to schedule an appointment at [phone] or take the enclosed Therapy Prescription to a facility of your choice.  - No dental work for 3-6 months after your surgery.  - You must have someone provide transportation post surgery and someone to monitor you for at least 24 hours post-surgery. If you don't have either of these your appointment will be canceled.     TIME OFF WORK  FMLA or Disability forms can be faxed directly to: [phone] or you may drop them off at [address] [place] [address]. Our office charges a $35.00 fee per form. Forms will be completed within 10 business days of drop off and payment received. For the status of your forms you may contact our disability office directly [phone].    MEDICATION INSTRUCTIONS **Please read section completely**    The following medications should be stopped a minimum of 10 days prior to surgery:  All over the counter, Supplements & Herbal medications    Anorectics: Phentermine (Adipex-P, Lomaira and Suprenza), Phentermine-topiramate (Qsymia), Bupropion-naltrexone (Contrave)    Opiod Partial Agonists/Opioid Antagonists: Buprenorphine (Subocone, Belbuca, Butrans, Probuphine Implant, Sublocade), Naltrexone (ReVia, Vivitrol), Naloxone    Amphetamines: Dextroamphetamine/Amphetamine (Adderall, Mydayis), Methylphenidate Hydrochloride (Concerta, Metadate, Methylin, Ritalin)    The following medications should be stopped 5 days prior to surgery:  Blood Thinners: Any Aspirin, Aspirin products, anti-inflammatories such as ibuprofen and any blood thinners such as Coumadin and Plavix. Please consult your prescribing physician if you are on life saving blood thinners, in  regards to when to stop medications prior to surgery.     The following medications should be stopped a minimum of 3 days prior to surgery:  PDE-5 inhibitors: Sildenafil (Viagra), Tadalafil (Cialis), Vardenafil (Levitra), Avanafil (Stendra)    MAO Inhibitors: Rasagiline (Azilect), Selegiline (Eldepryl, Emsam, Selapar), Isocarboxazid (Marplan), Phenelzine (Nardil)

## 2023-05-23 LAB
HCT VFR BLD AUTO: 32.9 % (ref 37–47)
HGB BLD-MCNC: 10.9 G/DL (ref 12–16)

## 2023-05-23 PROCEDURE — 85018 HEMOGLOBIN: CPT

## 2023-05-23 PROCEDURE — 97535 SELF CARE MNGMENT TRAINING: CPT

## 2023-05-23 PROCEDURE — 36415 COLL VENOUS BLD VENIPUNCTURE: CPT

## 2023-05-23 PROCEDURE — 97110 THERAPEUTIC EXERCISES: CPT

## 2023-05-23 PROCEDURE — 770001 HCHG ROOM/CARE - MED/SURG/GYN PRIV*

## 2023-05-23 PROCEDURE — 700102 HCHG RX REV CODE 250 W/ 637 OVERRIDE(OP): Performed by: PHYSICIAN ASSISTANT

## 2023-05-23 PROCEDURE — 700105 HCHG RX REV CODE 258: Performed by: ORTHOPAEDIC SURGERY

## 2023-05-23 PROCEDURE — 85014 HEMATOCRIT: CPT

## 2023-05-23 PROCEDURE — 700111 HCHG RX REV CODE 636 W/ 250 OVERRIDE (IP): Performed by: PHYSICIAN ASSISTANT

## 2023-05-23 PROCEDURE — 700105 HCHG RX REV CODE 258: Performed by: PHYSICIAN ASSISTANT

## 2023-05-23 PROCEDURE — 97165 OT EVAL LOW COMPLEX 30 MIN: CPT

## 2023-05-23 PROCEDURE — 94760 N-INVAS EAR/PLS OXIMETRY 1: CPT

## 2023-05-23 PROCEDURE — A9270 NON-COVERED ITEM OR SERVICE: HCPCS | Performed by: PHYSICIAN ASSISTANT

## 2023-05-23 PROCEDURE — 97162 PT EVAL MOD COMPLEX 30 MIN: CPT

## 2023-05-23 PROCEDURE — 97116 GAIT TRAINING THERAPY: CPT

## 2023-05-23 PROCEDURE — 99024 POSTOP FOLLOW-UP VISIT: CPT | Performed by: ORTHOPAEDIC SURGERY

## 2023-05-23 RX ORDER — SODIUM CHLORIDE 9 MG/ML
1000 INJECTION, SOLUTION INTRAVENOUS ONCE
Status: COMPLETED | OUTPATIENT
Start: 2023-05-23 | End: 2023-05-23

## 2023-05-23 RX ORDER — ACETAMINOPHEN 325 MG/1
650 TABLET ORAL EVERY 6 HOURS
Qty: 30 TABLET | Refills: 0 | Status: SHIPPED | OUTPATIENT
Start: 2023-05-23

## 2023-05-23 RX ORDER — SODIUM CHLORIDE 9 MG/ML
2000 INJECTION, SOLUTION INTRAVENOUS ONCE
Status: COMPLETED | OUTPATIENT
Start: 2023-05-23 | End: 2023-05-23

## 2023-05-23 RX ADMIN — KETOROLAC TROMETHAMINE 15 MG: 30 INJECTION, SOLUTION INTRAMUSCULAR at 11:53

## 2023-05-23 RX ADMIN — TAMSULOSIN HYDROCHLORIDE 0.4 MG: 0.4 CAPSULE ORAL at 07:50

## 2023-05-23 RX ADMIN — ACETAMINOPHEN 650 MG: 325 TABLET ORAL at 00:02

## 2023-05-23 RX ADMIN — KETOROLAC TROMETHAMINE 15 MG: 30 INJECTION, SOLUTION INTRAMUSCULAR at 00:02

## 2023-05-23 RX ADMIN — ASPIRIN 81 MG: 81 TABLET, COATED ORAL at 07:50

## 2023-05-23 RX ADMIN — DOCUSATE SODIUM 100 MG: 100 CAPSULE, LIQUID FILLED ORAL at 18:08

## 2023-05-23 RX ADMIN — METFORMIN HYDROCHLORIDE 500 MG: 500 TABLET ORAL at 05:31

## 2023-05-23 RX ADMIN — ACETAMINOPHEN 650 MG: 325 TABLET ORAL at 18:08

## 2023-05-23 RX ADMIN — ACETAMINOPHEN 650 MG: 325 TABLET ORAL at 11:53

## 2023-05-23 RX ADMIN — DEXAMETHASONE SODIUM PHOSPHATE 10 MG: 4 INJECTION, SOLUTION INTRA-ARTICULAR; INTRALESIONAL; INTRAMUSCULAR; INTRAVENOUS; SOFT TISSUE at 05:31

## 2023-05-23 RX ADMIN — SODIUM CHLORIDE 1000 ML: 9 INJECTION, SOLUTION INTRAVENOUS at 06:20

## 2023-05-23 RX ADMIN — VANCOMYCIN HYDROCHLORIDE 750 MG: 500 INJECTION, POWDER, LYOPHILIZED, FOR SOLUTION INTRAVENOUS at 02:34

## 2023-05-23 RX ADMIN — SODIUM CHLORIDE 2000 ML: 9 INJECTION, SOLUTION INTRAVENOUS at 13:23

## 2023-05-23 RX ADMIN — KETOROLAC TROMETHAMINE 15 MG: 30 INJECTION, SOLUTION INTRAMUSCULAR at 18:08

## 2023-05-23 RX ADMIN — ASPIRIN 81 MG: 81 TABLET, COATED ORAL at 19:41

## 2023-05-23 RX ADMIN — KETOROLAC TROMETHAMINE 15 MG: 30 INJECTION, SOLUTION INTRAMUSCULAR at 05:30

## 2023-05-23 RX ADMIN — SODIUM CHLORIDE 1000 ML: 9 INJECTION, SOLUTION INTRAVENOUS at 11:30

## 2023-05-23 RX ADMIN — ACETAMINOPHEN 650 MG: 325 TABLET ORAL at 05:30

## 2023-05-23 ASSESSMENT — COGNITIVE AND FUNCTIONAL STATUS - GENERAL
DRESSING REGULAR UPPER BODY CLOTHING: A LITTLE
PERSONAL GROOMING: A LITTLE
MOBILITY SCORE: 19
TOILETING: A LITTLE
SUGGESTED CMS G CODE MODIFIER MOBILITY: CK
MOVING FROM LYING ON BACK TO SITTING ON SIDE OF FLAT BED: A LITTLE
DRESSING REGULAR LOWER BODY CLOTHING: A LOT
WALKING IN HOSPITAL ROOM: A LITTLE
HELP NEEDED FOR BATHING: A LITTLE
TURNING FROM BACK TO SIDE WHILE IN FLAT BAD: A LITTLE
STANDING UP FROM CHAIR USING ARMS: A LITTLE
CLIMB 3 TO 5 STEPS WITH RAILING: A LOT
HELP NEEDED FOR BATHING: A LOT
SUGGESTED CMS G CODE MODIFIER DAILY ACTIVITY: CK
TOILETING: A LITTLE
MOVING FROM LYING ON BACK TO SITTING ON SIDE OF FLAT BED: A LITTLE
DAILY ACTIVITIY SCORE: 17
WALKING IN HOSPITAL ROOM: A LITTLE
SUGGESTED CMS G CODE MODIFIER DAILY ACTIVITY: CJ
DAILY ACTIVITIY SCORE: 22
SUGGESTED CMS G CODE MODIFIER MOBILITY: CJ
CLIMB 3 TO 5 STEPS WITH RAILING: A LITTLE
MOBILITY SCORE: 20

## 2023-05-23 ASSESSMENT — PATIENT HEALTH QUESTIONNAIRE - PHQ9
2. FEELING DOWN, DEPRESSED, IRRITABLE, OR HOPELESS: NOT AT ALL
SUM OF ALL RESPONSES TO PHQ9 QUESTIONS 1 AND 2: 0
1. LITTLE INTEREST OR PLEASURE IN DOING THINGS: NOT AT ALL

## 2023-05-23 ASSESSMENT — GAIT ASSESSMENTS
GAIT LEVEL OF ASSIST: MINIMAL ASSIST
DISTANCE (FEET): 40
DEVIATION: ANTALGIC;STEP TO
DISTANCE (FEET): 20
ASSISTIVE DEVICE: FRONT WHEEL WALKER

## 2023-05-23 ASSESSMENT — PAIN DESCRIPTION - PAIN TYPE
TYPE: SURGICAL PAIN

## 2023-05-23 ASSESSMENT — ACTIVITIES OF DAILY LIVING (ADL): TOILETING: INDEPENDENT

## 2023-05-23 NOTE — OR NURSING
1140- Pt to room and settled. Patient allergies and NPO status verified, home medication reconciliation completed and belongings secured. Patient verbalizes understanding of pain scale, expected course of stay and plan of care. Surgical site verified with patient. IV access established. Sequentials placed on legs.   
1435: To PACU post left total knee arthroplasty revision w/ block. Pt is extubated, breathing is spontaneous and unlabored. Strong DP pulse observed on operative extremity.    1532: Pt sleeping, awakens somewhat. Unable to give a pain number before falling back asleep. Declines water or juice at this time.    1613: Pt awoken, took a sip of water, back to sleep.    1700: Pt sleeping, awakens easily. Declines water or juice. Denies pain or nausea. Back to sleep.    1722: Report given to Leydi RODNEY.  
1722: Report from Elfego RODNEY. Patient waiting for inpatient bed assignment. Resting with eyes closed. Equal unlabored respirations. Dressing CDI.     1732: Report to Yesika RODNEY.  
1732: Report from Leydi RODNEY. Sleeping, wakes to voice. LLE dressing CDI. Denies pain/nausea/need for intervention.     1803: Report to Shivani RODNEY.   
1803: Care assumed of sleeping pt awaiting room assignment. Pillow present under operative lower extremity ankle only  1810: Medicated for onset nausea.  1825: Nausea resolved, taking po apple juice per ERAS orders  1838: SPO2 drops to 85% on trial of RA - O2 resumed. Pt attempting to void via bedpan.   1921: Transferred on O2 tank  >1/2 FULL         
53

## 2023-05-23 NOTE — FACE TO FACE
Face to Face Supporting Documentation - Home Health    The encounter with this patient was in whole or in part the primary reason for home health admission.    Date of encounter:   Patient:                    MRN:                       YOB: 2024  Sarah Burger  [id number]  1945     Home health to see patient for:  Skilled Nursing care for assessment, interventions & education, Physical Therapy evaluation and treatment, and Occupational therapy evaluation and treatment    Skilled need for:  Surgical Aftercare s/p revision left knee replacement;     Skilled nursing interventions to include:  Wound Care-keep incisional wound vac in place, reinforce as needed    Homebound status evidenced by:  Need the aid of supportive devices such as crutches, canes, wheelchairs or walkers or Needs the assistance of another person in order to leave the home. Leaving home requires a considerable and taxing effort. There is a normal inability to leave the home.    Community Physician to provide follow up care: Woodrow Aldrich D.O.     Optional Interventions? No      I certify the face to face encounter for this home health care referral meets the CMS requirements and the encounter/clinical assessment with the patient was, in whole, or in part, for the medical condition(s) listed above, which is the primary reason for home health care. Based on my clinical findings: the service(s) are medically necessary, support the need for home health care, and the homebound criteria are met.  I certify that this patient has had a face to face encounter by myself.  Anastacio De La Cruz M.D. - NPI: [id number]

## 2023-05-23 NOTE — THERAPY
Occupational Therapy   Initial Evaluation     Patient Name: Sarah Burger  Age:  77 y.o., Sex:  female  Medical Record #: [id number]  Today's Date: 5/23/2023     Precautions  Precautions: Fall Risk, Weight Bearing As Tolerated Left Lower Extremity    Assessment  Patient is 77 y.o. female admit for L TKA.  Pt currently NOT tolerating basic self care tasks, ADL transfers and simple mobility with FWW in the room as expected on POD#1.  She is tremulous, weak and has lower BP than normal.  Pt lives alone and has little assist from friends at home. At this time, pt is NOT physically appropriate for safe discharge home.  Due to lack of consistent support, she may need further inpt post acute therapy prior to home.  If she improves significantly, and can get friends to commit to providing increased assist at home, she may be approp for home with HH therapy.  Will follow 5/24 for updated status.    Treatment completed this session after initial evaluation completed:  Educated pt on role of OT and Total Knee Replacement Protocol.  Reviewed application of precautions and use of Adaptive Equipment for dressing, bathing, toileting, grooming, and general functional mobility in the home.  Discussed use of reacher, shower chair and raised toilet seat to assist with ADL's.  Pt verbalized understanding of all education provided. Will need further ADL education next session.     Plan    Occupational Therapy Initial Treatment Plan   Treatment Interventions: Self Care / Activities of Daily Living, Adaptive Equipment, Neuro Re-Education / Balance, Therapeutic Exercises, Therapeutic Activity  Treatment Frequency: 4 Times per Week  Duration: Until Therapy Goals Met    DC Equipment Recommendations: Unable to determine at this time  Discharge Recommendations: Recommend post-acute placement for additional occupational therapy services prior to discharge home (vs home with HH IF she improves functionally and can arrange for friends to assist  "a bit more than she'd planned)     Subjective    \"I just feel dizzy and shaky, and this is not like me\"     Objective       05/23/23 1443   Prior Living Situation   Prior Services None   Housing / Facility 1 Story House   Steps Into Home 2   Steps In Home 0   Bathroom Set up Walk In Shower;Shower Chair   Equipment Owned Front-Wheel Walker;Tub / Shower Seat;Raised Toilet Seat Without Arms  (reacher avail to borrow if needed)   Lives with - Patient's Self Care Capacity Alone and Able to Care For Self   Comments Pt reports she has her \"troops\" friends that can check in on her intermittently but at this time no one to assist or stay with her full time.  She lives alone and is normally indep   Prior Level of ADL Function   Self Feeding Independent   Grooming / Hygiene Independent   Bathing Independent   Dressing Independent   Toileting Independent   Prior Level of IADL Function   Medication Management Independent   Laundry Independent   Kitchen Mobility Independent   Finances Independent   Home Management Independent   Shopping Independent   Prior Level Of Mobility Independent With Device in Community   Driving / Transportation Unable To Determine At This Time   Occupation (Pre-Hospital Vocational) Retired Due To Age   Precautions   Precautions Fall Risk;Weight Bearing As Tolerated Left Lower Extremity   Vitals   Blood Pressure  93/54  (prior to getting OOB)   Vitals Comments 98/52 after walking to BR and back to EOB   Pain 0 - 10 Group   Location Knee   Location Orientation Left   Description Numb;Sore   Therapist Pain Assessment During Activity;Nurse Notified  (not rated, feels tender, she is afraid to WB on LLE)   Cognition    Level of Consciousness Alert   Comments pleasant, cooperative.  Tangential at times, appears to have difficulty focusing on simple directions at times   Active ROM Upper Body   Active ROM Upper Body  WDL   Strength Upper Body   Upper Body Strength  WDL   Coordination Upper Body   Comments " treumulous BUE and BLE   Balance Assessment   Sitting Balance (Static) Fair +   Sitting Balance (Dynamic) Fair   Standing Balance (Static) Fair   Standing Balance (Dynamic) Fair -   Weight Shift Sitting Fair   Weight Shift Standing Fair   Comments with FWW   Bed Mobility    Supine to Sit Standby Assist   Sit to Supine Standby Assist   Scooting Standby Assist   Comments HOB slightly elevated   ADL Assessment   Lower Body Dressing Maximal Assist  (socks)   Toileting Minimal Assist  (assist with clothing mgmt)   How much help from another person does the patient currently need...   Putting on and taking off regular lower body clothing? 2   Bathing (including washing, rinsing, and drying)? 2   Toileting, which includes using a toilet, bedpan, or urinal? 3   Putting on and taking off regular upper body clothing? 3   Taking care of personal grooming such as brushing teeth? 3   Eating meals? 4   6 Clicks Daily Activity Score 17   Functional Mobility   Sit to Stand Contact Guard Assist   Bed, Chair, Wheelchair Transfer Minimal Assist   Toilet Transfers Minimal Assist   Transfer Method Stand Step   Mobility to BR with FWW   Distance (Feet) 20   # of Times Distance was Traveled 2   Comments limited by trembling, c/o dizziness and weakness   Edema / Skin Assessment   Comments ARAMIS intact   Activity Tolerance   Sitting in Chair 5 min toilet   Sitting Edge of Bed 3 min, 2 min   Standing 3 min x2   Comments limited by fatigue, weakness   Patient / Family Goals   Patient / Family Goal #1 home   Short Term Goals   Short Term Goal # 1 Pt will dress from seated with setup and AE as needed in 3 visits   Short Term Goal # 2 Pt will tolerate 10 min standing for ADL's with SBA in 3 visits   Short Term Goal # 3 Pt will toilet supervised in 3 visits   Education Group   Education Provided Role of Occupational Therapist;Activities of Daily Living   Role of Occupational Therapist Patient Response Patient;Acceptance;Explanation;Verbal  Demonstration   ADL Patient Response Patient;Acceptance;Explanation;Verbal Demonstration   Additional Comments see notes

## 2023-05-23 NOTE — THERAPY
Occupational Therapy Contact Note    Patient Name: Sarah Burger  Age:  77 y.o., Sex:  female  Medical Record #: [id number]  Today's Date: 5/23/2023 05/23/23 1302   Initial Contact Note    Initial Contact Note Order Received and Verified, Occupational Therapy Evaluation in Progress with Full Report to Follow.   Interdisciplinary Plan of Care Collaboration   IDT Collaboration with  Nursing;Certified Nursing Assistant   Collaboration Comments Attempted OT eval, pt's BP is 75/44 in supine.  She was symptomatic with low BP this am with PT.  RN has IV fluids running currently.  Will check back as able when BP improved.

## 2023-05-23 NOTE — FLOWSHEET NOTE
05/23/23 0730   Events/Summary/Plan   Skin Inspection Respiratory Device Intact   Vital Signs   Pulse 73   Respiration 18   Pulse Oximetry 94 %   $ Pulse Oximetry (Spot Check) Yes   Respiratory Assessment   Level of Consciousness Alert   Oxygen   O2 (LPM) 0   O2 Delivery Device None - Room Air

## 2023-05-23 NOTE — PROGRESS NOTES
4 Eyes Skin Assessment Completed by RASHAUN Reinoso and Yovanny RN.    Head WDL  Ears WDL  Nose WDL  Mouth WDL  Neck WDL  Breast/Chest WDL  Shoulder Blades WDL  Spine WDL  (R) Arm/Elbow/Hand WDL  (L) Arm/Elbow/Hand WDL  Abdomen WDL  Groin WDL  Scrotum/Coccyx/Buttocks WDL  (R) Leg WDL  (L) Leg Incision  (R) Heel/Foot/Toe WDL  (L) Heel/Foot/Toe WDL          Devices In Places Blood Pressure Cuff, Pulse Ox, SCD's, and Nasal Cannula      Interventions In Place NC W/Ear Foams and Pillows    Possible Skin Injury No    Pictures Uploaded Into Epic N/A  Wound Consult Placed N/A  RN Wound Prevention Protocol Ordered No

## 2023-05-23 NOTE — THERAPY
Physical Therapy   Initial Evaluation     Patient Name: Sarah Burger  Age:  77 y.o., Sex:  female  Medical Record #: [id number]  Today's Date: 5/23/2023     Precautions  Precautions: (P) Fall Risk;Weight Bearing As Tolerated Left Lower Extremity    Assessment  Patient is 77 y.o. female who was seen s/p left TKA with WBAT orders for left lower extremity. Pt was agreeable to therapy evaluation and presented with impaired balance, weakness, impaired coordination, and poor upright activity tolerance. Pt was primarily limited by weakness and tremulous activity with upright mobility.  Pt was provided with education on elevation, icing, positioning, and supine/seated therapeutic exercises. Pt was able to demonstrate CGA to Min A for all upright mobility with use of FWW. Pt was only able to tolerate about 40 ft of ambulation at a time due to fatigue and weakness. Pt reported of dizziness with upright mobility, however, it appeared to remain stable and not get any worse. Pt maintained a BP of 85/50 throughout mobility. RN was made aware of concerns. At this time pt is a high fall risk and would benefit from another night stay. If pt does not progress with mobility anticipate pt to d/c to post acute setting. Will continue to follow and update plan of care.     The pt was provided with the following skilled therapy txt: Pt was provided with VC, demo, and facilitation during gait training in order to return demo terminal knee extension and appropriate heel strike/flexion of knee in order to return demo appropriate heel to toe gait mechanics. Pt was provided with VC and TC for appropriate quad contraction and mechanics during supine/seated therapeutic exercises. Pt was provided with education on intensity, frequency, and duration of exercises.    Plan    Physical Therapy Initial Treatment Plan   Treatment Plan : (P) Bed Mobility, Equipment, Gait Training, Manual Therapy, Neuro Re-Education / Balance, Self Care / Home  Evaluation, Stair Training, Therapeutic Activities, Therapeutic Exercise  Treatment Frequency: (P) Daily  Duration: (P) Until Therapy Goals Met    DC Equipment Recommendations: (P) Front-Wheel Walker  Discharge Recommendations: (P) Other - (At this time recommend post acute placement given poor mobility and activity tolerance, however, if pt improves anticipate pt to d/c home with  therapy services)     Objective       05/23/23 1001   Initial Contact Note    Initial Contact Note Order Received and Verified, Physical Therapy Evaluation in Progress with Full Report to Follow.   Precautions   Precautions Fall Risk;Weight Bearing As Tolerated Left Lower Extremity   Vitals   Blood Pressure  (!) 85/50   Pulse Oximetry 95 %   O2 (LPM) 0   O2 Delivery Device None - Room Air   Pain 0 - 10 Group   Location Knee   Location Orientation Left   Description Numb;Sore   Therapist Pain Assessment Prior to Activity;During Activity;Post Activity;Nurse Notified;2   Prior Living Situation   Prior Services None   Housing / Facility 1 Story House   Steps Into Home 2   Steps In Home 0   Equipment Owned Front-Wheel Walker   Lives with - Patient's Self Care Capacity Alone and Able to Care For Self   Comments pt states she primarily lives alone and will have friends to assist intermittently during the day   Prior Level of Functional Mobility   Bed Mobility Independent   Transfer Status Independent   Ambulation Independent   Ambulation Distance   (community)   Assistive Devices Used Single Point Cane   Stairs Independent   Comments reports of using SPC due to knee pain   History of Falls   History of Falls No   Cognition    Cognition / Consciousness WDL   Level of Consciousness Alert   Comments pleasant/cooperative   Passive ROM Upper Body   Passive ROM Upper Body WDL   Active ROM Upper Body   Active ROM Upper Body  WDL   Strength Upper Body   Upper Body Strength  WDL   Sensation Upper Body   Upper Extremity Sensation  WDL   Upper Body  Muscle Tone   Upper Body Muscle Tone  WDL   Passive ROM Lower Body   Passive ROM Lower Body X   Comments limited by pain, able to demo 0-60 deg of L knee ROM   Active ROM Lower Body    Active ROM Lower Body  X   Comments limited by pain, able to demo 0-45 deg of L knee ROM   Strength Lower Body   Lower Body Strength  X   Comments limited in L LE due to pain, able to demo functional strength for B LE   Sensation Lower Body   Lower Extremity Sensation   WDL   Lower Body Muscle Tone   Lower Body Muscle Tone  WDL   Neurological Concerns   Neurological Concerns No   Coordination Upper Body   Coordination X   Comments presents with tremulous activity/shaking in B UE/B LE   Coordination Lower Body    Coordination Lower Body  X   Comments presents with tremulous activity/shaking in B UE/B LE   Balance Assessment   Sitting Balance (Static) Fair +   Sitting Balance (Dynamic) Fair   Standing Balance (Static) Fair   Standing Balance (Dynamic) Fair -   Weight Shift Sitting Fair   Weight Shift Standing Fair   Comments w/fww   Bed Mobility    Supine to Sit Standby Assist   Sit to Supine Standby Assist   Scooting Standby Assist   Comments HOB flat and rails down   Gait Analysis   Gait Level Of Assist Minimal Assist   Assistive Device Front Wheel Walker   Distance (Feet) 40   # of Times Distance was Traveled 3   Deviation Antalgic;Step To   Weight Bearing Status WBAT L LE   Functional Mobility   Sit to Stand Contact Guard Assist   Bed, Chair, Wheelchair Transfer Minimal Assist   Toilet Transfers Minimal Assist   Transfer Method Stand Step   Mobility w/FWW use   How much difficulty does the patient currently have...   Turning over in bed (including adjusting bedclothes, sheets and blankets)? 4   Sitting down on and standing up from a chair with arms (e.g., wheelchair, bedside commode, etc.) 3   Moving from lying on back to sitting on the side of the bed? 4   How much help from another person does the patient currently need...    Moving to and from a bed to a chair (including a wheelchair)? 3   Need to walk in a hospital room? 3   Climbing 3-5 steps with a railing? 2   6 clicks Mobility Score 19   Activity Tolerance   Sitting in Chair 5 mins on toilet   Sitting Edge of Bed 5 mins   Standing 3-4 mins x 3   Comments limited by fatigue and weakness   Edema / Skin Assessment   Edema / Skin  Not Assessed   Patient / Family Goals    Patient / Family Goal #1 to go home   Short Term Goals    Short Term Goal # 1 pt will go supine<>sit w/hob flat and rails down w/Mod I in 6tx   Short Term Goal # 2 pt will go sit<>stand w/fww w/Mod I in 6tx   Short Term Goal # 3 pt will ambulate for 150ft w/fww w/Mod I in 6tx   Short Term Goal # 4 pt will go up/down 2 steps w/Mod I in 6tx   Education Group   Education Provided Role of Physical Therapist   Role of Physical Therapist Patient Response Patient;Acceptance;Explanation;Demonstration;Verbal Demonstration;Action Demonstration   Physical Therapy Initial Treatment Plan    Treatment Plan  Bed Mobility;Equipment;Gait Training;Manual Therapy;Neuro Re-Education / Balance;Self Care / Home Evaluation;Stair Training;Therapeutic Activities;Therapeutic Exercise   Treatment Frequency Daily   Duration Until Therapy Goals Met   Problem List    Problems Impaired Bed Mobility;Impaired Ambulation;Impaired Transfers;Functional ROM Deficit;Functional Strength Deficit;Impaired Balance;Impaired Coordination;Decreased Activity Tolerance   Anticipated Discharge Equipment and Recommendations   DC Equipment Recommendations Front-Wheel Walker   Discharge Recommendations Other -  (At this time recommend post acute placement given poor mobility and activity tolerance, however, if pt improves anticipate pt to d/c home with  therapy services)   Interdisciplinary Plan of Care Collaboration   IDT Collaboration with  Nursing   Patient Position at End of Therapy In Bed;Call Light within Reach;Tray Table within Reach;Phone within Reach    Collaboration Comments aware of visit and recs   Session Information   Date / Session Number  5/23-1 (1/7, 5/30)   Priority 4

## 2023-05-23 NOTE — DISCHARGE PLANNING
Care Transition Team Assessment    Information Source  Orientation Level: Oriented X4  Who is responsible for making decisions for patient? : Patient    Readmission Evaluation  Is this a readmission?: No    Elopement Risk  Legal Hold: No  Ambulatory or Self Mobile in Wheelchair: No-Not an Elopement Risk  Elopement Risk: Not at Risk for Elopement    Interdisciplinary Discharge Planning  Lives with - Patient's Self Care Capacity: Alone and Able to Care For Self  Patient or legal guardian wants to designate a caregiver: No  Support Systems: Friends / Neighbors  Housing / Facility: [address] [facility]  Prior Services: None  Durable Medical Equipment: Walker (cane)    Discharge Preparedness  What is your plan after discharge?: Home health care, Home with help  What are your discharge supports?: Other (comment) (neighbor/friend)  Prior Functional Level: Ambulatory, Independent with Activities of Daily Living, Independent with Medication Management  Difficulity with ADLs: Walking  Difficulty with ADLs Comment: uses cane, FFW, shower chair, and toilet seat riser    Functional Assesment  Prior Functional Level: Ambulatory, Independent with Activities of Daily Living, Independent with Medication Management    Finances  Financial Barriers to Discharge: No  Prescription Coverage: Yes    Vision / Hearing Impairment  Vision Impairment : Yes  Right Eye Vision: Wears Glasses  Left Eye Vision: Wears Glasses  Hearing Impairment : No         Advance Directive  Advance Directive?: None    Domestic Abuse  Have you ever been the victim of abuse or violence?: No  Physical Abuse or Sexual Abuse: No  Verbal Abuse or Emotional Abuse: No  Possible Abuse/Neglect Reported to:: Not Applicable    Psychological Assessment  History of Substance Abuse: None  History of Psychiatric Problems: No  Non-compliant with Treatment: No  Newly Diagnosed Illness: No    Discharge Risks or Barriers  Discharge risks or barriers?: Lives alone, no community support,  Complex medical needs, Post-acute placement / services  Patient risk factors: Complex medical needs, Lack of outside supports, Lives alone and no community support, Vulnerable adult    Anticipated Discharge Information  Discharge Disposition: D/T to home under HHA care in anticipation of covered skilled care (06)  Discharge Address: [address] [place] [address]  Discharge Contact Phone Number: [phone]    Case Management Discharge Planning    Admission Date: 5/22/2023  GMLOS: 1.7  ALOS: 1    6-Clicks ADL Score: 22  6-Clicks Mobility Score: 19      Anticipated Discharge Dispo: Discharge Disposition: D/T to home under HHA care in anticipation of covered skilled care (06)  Discharge Address: [address] [place] [address]  Discharge Contact Phone Number: [phone]    DME Needed: No, patient has her own walker and cane.    Action(s) Taken: Updated Provider/Nurse on Discharge Plan, Choice obtained, Referral(s) sent, Acceptance Received, and OTHER: RN CESARIO spoke with the patient at the bedside regarding discharge planning needs/barriers. Patient agreeable to  services on discharge, if they are needed. She reports that she has scheduled an Outpatient PT appointment for Friday, May 26, 2023 at 0900 with the same outpatient therapy she used with the last surgery. Patient stated that she does not want to go to a SNF on discharge and reports a plan to work hard to discharge home to her outpatient therapy. She provided choice for [facility] and [facility], stating that the choices were only for a last resort, if she could not go home on discharge. Choices faxed to [facility] to upload to the chart and follow-up on referrals.    Escalations Completed: Provider, Pending Discharge Destination, and Bedside RN    Medically Clear: No    Next Steps: Continue to monitor for changes and update the discharge plan accordingly. Follow-up with the Attending and Bedside RN for any issues/concerns and  assist as indicated. Follow-up with the DPA for any accepted referrals.    Barriers to Discharge: Medical clearance and Pending PT Evaluation    Is the patient up for discharge tomorrow: Yes    Is transport arranged for discharge disposition: Yes, friend to provide transport on discharge.

## 2023-05-23 NOTE — DISCHARGE SUMMARY
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 Anastacio De La Cruz on the date of admission. Please see dictated operative note for further information.    Hospital course: standard    The patient has done well, with no complications.  Patient denies chest pain, calf pain or shortness of breath.   Pain is well-controlled at present.  Patient is ambulating well with the use of an assistive device, and progressing in physical therapy.   Patient is neurologically and vascularly intact with palpable pedal pulses bilaterally.   Narcotic dosages were chosen by taking into account the the patient's previous history of opoid use and to ensure proper pain control after surgery    Discharge date: 5-24-23    Patient is being discharged to home after am physical therapy.     Allergies:  Patient has no known allergies.       Medication List        START taking these medications        Instructions   acetaminophen 325 MG Tabs  Commonly known as: Tylenol   Take 2 Tablets by mouth every 6 hours.  Dose: 650 mg            CHANGE how you take these medications        Instructions   meloxicam 7.5 MG Tabs  What changed: Another medication with the same name was removed. Continue taking this medication, and follow the directions you see here.  Commonly known as: MOBIC   Take 1 Tablet by mouth 2 times a day.  Dose: 7.5 mg            CONTINUE taking these medications        Instructions   aspirin 81 MG EC tablet  Commonly known as: Aspirin Adult Low Dose   Take 1 Tablet by mouth 2 (two) times a day.  Dose: 81 mg     docusate sodium 100 MG Caps  Commonly known as: Colace   Take 1 Capsule by mouth 3 times a day as needed for Constipation.  Dose: 100 mg     indapamide 2.5 MG Tabs  Commonly known as: LOZOL   Take 2.5 mg by mouth every  morning. Indications: to protect kidneys  Dose: 2.5 mg     metFORMIN 500 MG Tabs  Commonly known as: GLUCOPHAGE   Take 500 mg by mouth every day.  Dose: 500 mg     ondansetron 4 MG Tbdp  Commonly known as: ZOFRAN ODT   Take 1 Tablet by mouth every 6 hours as needed for Nausea/Vomiting.  Dose: 4 mg     oxyCODONE immediate-release 5 MG Tabs  Commonly known as: ROXICODONE   Take 1-2 Tablets by mouth every four hours as needed for Severe Pain (SEVERE POST OP PAIN) for up to 7 days.  Dose: 5-10 mg     potassium chloride SA 10 MEQ Tbcr  Commonly known as: K-DUR   Take 10 mEq by mouth every day.  Dose: 10 mEq     traMADol 50 MG Tabs  Commonly known as: Ultram   Take 1 Tablet by mouth every 6 hours as needed for Severe Pain (TO ALTERNATE WITH OXYCODONE FOR POST OP PAIN) for up to 10 days.  Dose: 50 mg              Discharge Instructions:     Patient is instructed to ambulate and weight bear as tolerated with the use of an assistive device, and to continue physical therapy exercises given during this hospital stay. Strict posterior hip precautions are to be observed for patients who underwent a total hip replacement.   Patient is to ice and elevate the surgical leg regularly, with pillows under the ankle, nothing is to be placed under the knee.   Patient was given detailed wound care instructions, and will leave the Incisional vac or silver dressing on until first post-op visit.   ASA twice daily for DVT prophylaxis.  Patient is to follow up with Dr. De La Cruz's office in 1-2 weeks.

## 2023-05-23 NOTE — DISCHARGE INSTRUCTIONS
Dr. De La Cruz's Discharge Instructions:  The first week after your joint replacement is a time to recover from the surgery. We expect light exercise to keep you active and mobile. Dr. De La Cruz requires a walker or cane for most patients in the first few days following surgery to try to prevent falls or complications.     Most patients are prescribed two medications for pain control: a narcotic such as Oxycodone or Hydrocodone and a milder medication called Tramadol. These can be alternated for pain control, and the priority is to decrease use of the stronger narcotic as soon as tolerated.   Take your pain medication as appropriate to ensure that your pain is not limiting your recovery. You'll be seen in clinic in 7-10 days (this appointment has already been made, call our office if you're unsure of the time). At that time, we'll prescribe your physical therapy to help with the recovery phase.     -Keep dressing clean and dry. Leave dressing in place until follow up. If you have an incisional vac dressing, the battery may die before your first post operative appointment, but you can leave the surgical dressing in place and it will be removed at your clinic visit. The battery of the dressing may die, and the sponge will inflate because it is no longer holding suction. You can still leave the dressing in place and it will be removed at the office. Call the office if you notice drainage from the surgical dressing.    -OK to shower, keep dressing in place. Pat dressing dry, do not rub incision    -Do not soak incision in bath, hot tub or pool    -Follow up with Dr. De La Cruz at regularly scheduled time    -Call AB office at [phone] for questions    -Weight bearing status: as tolerated with walker/cane; Patients who underwent a hip replacement should observe posterior hip precautions    -Take medication as prescribed for DVT prophylaxis

## 2023-05-23 NOTE — PROGRESS NOTES
Report received from PACU nurse, pt came in via gurney at 1942. Pt transferred to bed and placed on comfortable position.  Pt alert and oriented x 4, received with ongoing oxygen at 3 L/min via nasal cannula; not in SOB/respiratory distress.   Assessment done. Pain scale of 0/10. Complains of nausea upon arrival with yellow emesis noted; prn medication given as per MAR.   ARAMIS dressing dry and intact, kept monitored.  Fall precautions in place. Call light within reach. Bed alarm on and maintained.   Vs taken and monitored.   Voided freely, output of 500 ml, post operative.   Pt able to tolerate ambulation.   Hourly rounding in progress.      2335 Pt to ambulate to hallway  and denies dizziness and nausea initially. Pt suddenly experienced dizziness in the hallway; placed on chair outside room and transferred to bed via wheelchair. Vomitind noted; with 200 ml yellow emesis; prn medication given as per MAR.    0517 CNA informed this RN of pt's BP taken on left arm 81/42; Bp rechecked at the right arm 85/45. Paged on call for Dr. De La Cruz to notify this concern; awaiting callback.     0520 Received telephone order from VICTORIA Weiss of NS 1L Bolus; order placed.

## 2023-05-23 NOTE — PROGRESS NOTES
Bedside report received from night RN. Assumed care of patient. Alert and oriented, wakes easily to voice. Daily plan of care discussed. Pt denies intolerable pain to left knee. Also denies nausea, vomiting and dizziness when at bed. Dressing to left knee CDI. Call light and personal belongings within reach. Hourly rounding in place.     0730 IV Bolus completed. BP rechecked and reads 86/49. Denies nausea, vomiting and dizziness. Will continue to monitor.    0830 BP rechecked 83/44, tried to page Dr. De La Cruz's office and was directed to . Message and call back no left.       1507 Called Dr. De La Cruz's office to give an update on pt's condition. BP 98/52 after 2L of NS bolus. Per OT eval pt is still weak and shaky and recommended pt to stay overnight.     1527 Callback from 's office, per surgeon it's okay for pt to stay 1 more night.

## 2023-05-23 NOTE — CARE PLAN
The patient is Stable - Low risk of patient condition declining or worsening    Shift Goals  Clinical Goals: Pt will be able to ambulate at least 50ft within the shift. Pt will remain free from falls or injury thorughout the shift.  Patient Goals: sleep and rest  Family Goals: n/a    Progress made toward(s) clinical / shift goals:  Pt able to ambulate at least 50 ft within the shift. Dizziness, nausea and vomiting noted upon ambulation; prn medication for nausea given as per MAR. Pt is safe and free from falls or injury throughout the shift. Pt states pain at 1/10 at this time; declines need of prn medication. Pt has episode of low BP and ongoing NS 1L bolus given as per MAR and infusing well at this time.       Problem: Fall Risk  Goal: Patient will remain free from falls  Outcome: Progressing     Problem: Post-Operative Knee Replacement  Goal: Early mobilization post surgery  Outcome: Progressing     Problem: Pain - Post Surgery  Goal: Alleviation or reduction of pain post surgery  Outcome: Progressing     Problem: Respiratory/Oxygenation Function Post-Surgical  Goal: Patient will achieve/maintain normal respiratory rate/effort  Outcome: Progressing     Problem: Risk for Post Op Fluid Imbalance  Goal: Promotion of fluid balance  Outcome: Progressing     Problem: Bowel Elimination  Goal: Establish and maintain regular bowel function  Outcome: Progressing     Problem: Venous Thromboembolism (VTE) Prevention  Goal: The patient will remain free from venous thromboembolism (VTE)  Outcome: Progressing     Patient is not progressing towards the following goals:

## 2023-05-23 NOTE — DIETARY
"Nutrition services: Day 1 of admit.  Sarah Burger is a 77 y.o. 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 RD visited pt at bedside. She reports UBW is 116-117 lb but that she lost weight in the past week due to \"excitement\" for her surgery this week. Other than this, pt denies any prior unplanned wt losses or poor PO intake over the past 12 months. Pt reports she has been eating all of her meals here. Pt has no questions or concerns at this time. She is conscious of monitoring CHO intake for T2DM and is appropriate to remain on a regular diet as she is self-managing food choices and CHO intake. Current A1c is 5.3%.    Assessment:  Height: 167.6 cm (5' 6\")  Weight: 51.5 kg (113 lb 8.6 oz)  Body mass index is 18.33 kg/m²., BMI classification: Underweight  Diet/Intake: Regular; 50-75% x 1 meal per ADLs    Evaluation:   Dx list: failed left TKA admitted for revision. Hx arthritis, T2DM  Labs reviewed.  MAR: metformin, NS bolus,   Skin: L knee incision, no staged wounds  GI: last BM 5/21  Pt with no overt signs of fat or muscle wasting.    Malnutrition Risk: At risk w/ acute 2.5-3.5 lb wt loss in 1 week per pt report. This does not meet criteria for acute injury-related malnutrition, nor starvation-related malnutrition.    Recommendations/Plan:  Continue with regular diet as tolerated. Encourage intake of meals >50-75%.  Document intake of all PO as % taken in ADL's to provide interdisciplinary communication across all shifts.   Monitor weight.  Nutrition rep will continue to see patient for ongoing meal and snack preferences.     RD will screen weekly per department policy; available PRN.    "

## 2023-05-23 NOTE — PROGRESS NOTES
S:  s/p revision left TKA  Pain controlled  No N/V  No Chest Pain/SOB  Afebrile  Exam:    NAD A& O x3    RLE: +DF/PF/EHL SILT L4/L5/S1  LLE:  +DF/PF/EHL SILT L4/L5/S1    Plan:  Continue standard plan of care  Weight bearing: as tolerated with walker/cane  DVT prophylaxis: SCD/Teds + ASA  Dispo: home today; will order home health, patient can refuse if she doesn't want it

## 2023-05-23 NOTE — DISCHARGE PLANNING
"Received Choice form at: 1537  Agency/Facility name: [facility]  Referral sent per Choice form at: [id number]    "

## 2023-05-24 VITALS
HEIGHT: 66 IN | WEIGHT: 113.54 LBS | TEMPERATURE: 98.1 F | OXYGEN SATURATION: 96 % | DIASTOLIC BLOOD PRESSURE: 56 MMHG | HEART RATE: 75 BPM | RESPIRATION RATE: 18 BRPM | BODY MASS INDEX: 18.25 KG/M2 | SYSTOLIC BLOOD PRESSURE: 103 MMHG

## 2023-05-24 PROCEDURE — 700102 HCHG RX REV CODE 250 W/ 637 OVERRIDE(OP): Performed by: PHYSICIAN ASSISTANT

## 2023-05-24 PROCEDURE — 97116 GAIT TRAINING THERAPY: CPT

## 2023-05-24 PROCEDURE — 94760 N-INVAS EAR/PLS OXIMETRY 1: CPT

## 2023-05-24 PROCEDURE — 97535 SELF CARE MNGMENT TRAINING: CPT

## 2023-05-24 PROCEDURE — A9270 NON-COVERED ITEM OR SERVICE: HCPCS | Performed by: PHYSICIAN ASSISTANT

## 2023-05-24 PROCEDURE — 99024 POSTOP FOLLOW-UP VISIT: CPT | Performed by: ORTHOPAEDIC SURGERY

## 2023-05-24 PROCEDURE — 700111 HCHG RX REV CODE 636 W/ 250 OVERRIDE (IP): Performed by: PHYSICIAN ASSISTANT

## 2023-05-24 PROCEDURE — 97110 THERAPEUTIC EXERCISES: CPT

## 2023-05-24 RX ADMIN — DOCUSATE SODIUM 100 MG: 100 CAPSULE, LIQUID FILLED ORAL at 04:55

## 2023-05-24 RX ADMIN — ASPIRIN 81 MG: 81 TABLET, COATED ORAL at 07:42

## 2023-05-24 RX ADMIN — KETOROLAC TROMETHAMINE 15 MG: 30 INJECTION, SOLUTION INTRAMUSCULAR at 00:11

## 2023-05-24 RX ADMIN — METFORMIN HYDROCHLORIDE 500 MG: 500 TABLET ORAL at 04:55

## 2023-05-24 RX ADMIN — KETOROLAC TROMETHAMINE 15 MG: 30 INJECTION, SOLUTION INTRAMUSCULAR at 04:54

## 2023-05-24 RX ADMIN — ACETAMINOPHEN 650 MG: 325 TABLET ORAL at 11:59

## 2023-05-24 RX ADMIN — KETOROLAC TROMETHAMINE 15 MG: 30 INJECTION, SOLUTION INTRAMUSCULAR at 11:59

## 2023-05-24 RX ADMIN — ACETAMINOPHEN 650 MG: 325 TABLET ORAL at 00:12

## 2023-05-24 RX ADMIN — ACETAMINOPHEN 650 MG: 325 TABLET ORAL at 04:54

## 2023-05-24 ASSESSMENT — GAIT ASSESSMENTS
DISTANCE (FEET): 15
DISTANCE (FEET): 150
GAIT LEVEL OF ASSIST: SUPERVISED
DEVIATION: STEP TO
ASSISTIVE DEVICE: FRONT WHEEL WALKER

## 2023-05-24 ASSESSMENT — PATIENT HEALTH QUESTIONNAIRE - PHQ9
SUM OF ALL RESPONSES TO PHQ9 QUESTIONS 1 AND 2: 0
1. LITTLE INTEREST OR PLEASURE IN DOING THINGS: NOT AT ALL
2. FEELING DOWN, DEPRESSED, IRRITABLE, OR HOPELESS: NOT AT ALL

## 2023-05-24 ASSESSMENT — COGNITIVE AND FUNCTIONAL STATUS - GENERAL
SUGGESTED CMS G CODE MODIFIER MOBILITY: CH
HELP NEEDED FOR BATHING: A LITTLE
DRESSING REGULAR LOWER BODY CLOTHING: A LITTLE
TOILETING: A LITTLE
PERSONAL GROOMING: A LITTLE
SUGGESTED CMS G CODE MODIFIER DAILY ACTIVITY: CJ
MOBILITY SCORE: 24
DAILY ACTIVITIY SCORE: 20

## 2023-05-24 ASSESSMENT — PAIN DESCRIPTION - PAIN TYPE
TYPE: SURGICAL PAIN

## 2023-05-24 NOTE — PROGRESS NOTES
Pt discharged with MD order. Discharge instructions, prescriptions and follow-up appointments reviewed. Pt verbalized understanding. Copies of discharge papers given, all questions answered.  Transported via wheelchair with hospital staff escort.

## 2023-05-24 NOTE — PROGRESS NOTES
Bedside report received from night RN. Assumed care of patient. Alert and oriented, wakes easily to voice. Daily plan of care discussed. Pt denies intolerable pain to left knee. Safety precaution  in place, bed at lowest position. Hourly rounding in place.     1142 Pt cleared from PT/OT with recommendation of outpatient PT. Called Dr. De La Cruz's office for discharge order. Call back no given.       1227 ff-up call to Dr. De La Cruz's office. Left VM and CB no.     2813 3rd attempt to page Dr. De La Cruz for a DC order. Left CB no. 1131 call back from Dr. De La Cruz's medical asst. She sent msg to the surgeon who is currently doing surgery at the moment. Will get back to this RN once the order is obtained.

## 2023-05-24 NOTE — DOCUMENTATION QUERY
[facility]                                                                       Query Response Note      PATIENT:               BRAYAN KO  ACCT #:                  [id number]  MRN:                     [id number]  :                      1945  ADMIT DATE:       2023 11:03 AM  DISCH DATE:          RESPONDING  PROVIDER #:        [id number]           QUERY TEXT:    BMI <19 is documented in the Medical Record.  Can a diagnosis be provided to support this finding?    NOTE:  If an appropriate response is not listed below, please respond with a new note.      Note: If you agree with a diagnosis listed above, please remember to include it in your concurrent daily documentation and onto the Discharge Summary.      The patient's Clinical Indicators include:  77 year old female admitted for a left knee revision.    BMI 18.33    Risk factors: hip replacement, knee replacement, DM  Treatment: Physical therapy     If you have any questions, please contact:  Judith Palmer RN CDI [facility]  [email or web address]  Judith Palmer [address]  Options provided:   -- Underweight   -- Cachexia   -- Other explanation, please specify      Query created by: Judith Palmer on 2023 7:50 AM    RESPONSE TEXT:    Underweight          Electronically signed by:  KISHORE BEGUM MD 2023 5:43 AM

## 2023-05-24 NOTE — CARE PLAN
The patient is Stable - Low risk of patient condition declining or worsening    Shift Goals  Clinical Goals: Monitor BP. Pt will remain free from falls or injury throughout the shift.  Patient Goals: sleep and rest; get discharge tomorrow  Family Goals: n/a    Progress made toward(s) clinical / shift goals:  Pt is free from falls or injury throughout the shift. BP stable throughout the shift. Pt seen sleeping comfortably in between rounds. Hourly rounding in progress.      Problem: Post-Operative Knee Replacement  Goal: Patient's neurovascular status will be maintained or improve  Outcome: Progressing     Problem: Fall Risk  Goal: Patient will remain free from falls  Outcome: Progressing     Problem: Pain - Post Surgery  Goal: Alleviation or reduction of pain post surgery  Outcome: Progressing     Problem: Respiratory/Oxygenation Function Post-Surgical  Goal: Patient will achieve/maintain normal respiratory rate/effort  Outcome: Progressing        Patient is not progressing towards the following goals:

## 2023-05-24 NOTE — CARE PLAN
The patient is Stable - Low risk of patient condition declining or worsening    Shift Goals  Clinical Goals: Monitor BP, ambulate, wean off from O2  Patient Goals: Comfort and rest  Family Goals: NA    Progress made toward(s) clinical / shift goals: BP kept monitored through the shift, the latest reading was 91/42 but denies dizziness. Pt able to ambulate to the toilet with 1x assist with FWW. Able to maintain SPO2 above 90% on RA.     Patient is not progressing towards the following goals: NA

## 2023-05-24 NOTE — PROGRESS NOTES
S:  s/p revision left knee replacement  Had some hypotension, dizziness with PT, now improved    Pain controlled  No N/V  No Chest Pain/SOB  Afebrile  Exam:    NAD A& O x3    RLE: +DF/PF/EHL SILT L4/L5/S1  LLE:  +DF/PF/EHL SILT L4/L5/S1    Plan:  Continue standard plan of care  Weight bearing: as tolerated  DVT prophylaxis: SCD/Teds + ASA  Dispo: home today; if doesn't clear PT will need SNF placement

## 2023-05-24 NOTE — PROGRESS NOTES
Received report from day shift nurse, Tiffanie RODNEY. Assumed pt care at 1915.   Pt is A&Ox4, resting comfortably in bed. Pt on room air; no signs of SOB/respiratory distress. Vital signs seen. Pt states pain aching at 3/10 pain scale at this moment; declines need of prn pain medication. Needs attended well. Fall precautions in place. Bed at lowest position. Call light and personal belongings within reach.   Hourly rounding in progress.

## 2023-05-24 NOTE — THERAPY
"Occupational Therapy  Daily Treatment     Patient Name: Sarah Burger  Age:  77 y.o., Sex:  female  Medical Record #: [id number]  Today's Date: 5/24/2023     Precautions  Precautions: Fall Risk, Weight Bearing As Tolerated Left Lower Extremity    Assessment    Pt with improved activity tolerance today, less shaky.  Able to perform very simple ADL's and mobility in safely.  She needed a little assist for LB dressing but is currently refusing LB dressing equipment.  She plans to simplify her wardrobe when she gets home to not need socks or to tie laces.  Pt will have more help now from friends than she originally planned.  She appears approp for d/c home today.    Treatment completed this session after initial evaluation completed:  Educated pt on role of OT and Total Knee Replacement Protocol.  Reviewed application of precautions and use of Adaptive Equipment for dressing, bathing, toileting, grooming, (as needed) kitchen activities and general functional mobility in the home.  Reviewed the use of reacher, shower chair and raised toilet seat to assist with ADL's.  Reviewed stall shower transfers. Provided pt with suggestions on where to obtain needed items. Educated on mgmt of [facility] post op bandage with dressing and showering.  Pt verbalized understanding of all education provided.      Plan    Treatment Plan Status: Continue Current Treatment Plan (aniticipate likely d/c home today)  Type of Treatment: Self Care / Activities of Daily Living, Adaptive Equipment, Neuro Re-Education / Balance, Therapeutic Exercises, Therapeutic Activity  Treatment Frequency: 4 Times per Week  Treatment Duration: Until Therapy Goals Met    DC Equipment Recommendations:  (LB dressing equip however pt declines)  Discharge Recommendations: Anticipate that the patient will have no further occupational therapy needs after discharge from the hospital    Subjective    \"I have a friend that has all those tools for socks and stuff.  They are " "more trouble than they are worth.\"     Objective       05/24/23 1219   Cognition    Cognition / Consciousness WDL   Level of Consciousness Alert   Comments Pleasant, cooperative.  Better able to focus today, slightly impulsive at times.   Active ROM Upper Body   Active ROM Upper Body  WDL   Strength Upper Body   Upper Body Strength  WDL   Other Treatments   Other Treatments Provided worked on Dressing, training for ADL's and [facility] mgmt.  Pt reports she has friend planning to stay the night and others planning to take turns coming in to help her so she will have good support for the next couple days   Balance   Sitting Balance (Static) Good   Sitting Balance (Dynamic) Good   Standing Balance (Static) Fair +   Standing Balance (Dynamic) Fair +   Weight Shift Sitting Good   Weight Shift Standing Good   Skilled Intervention Verbal Cuing   Comments cues for safety with FWW, hand placement with sit to stand   Bed Mobility    Supine to Sit Modified Independent   Sit to Supine Modified Independent   Scooting Modified Independent   Activities of Daily Living   Grooming Supervision;Seated   Upper Body Dressing Modified Independent   Lower Body Dressing Standby Assist  (Oj for shoes and L sock- will wear loafers with no socks at home)   Skilled Intervention Verbal Cuing;Compensatory Strategies   How much help from another person does the patient currently need...   Putting on and taking off regular lower body clothing? 3   Bathing (including washing, rinsing, and drying)? 3   Toileting, which includes using a toilet, bedpan, or urinal? 3   Putting on and taking off regular upper body clothing? 4   Taking care of personal grooming such as brushing teeth? 3   Eating meals? 4   6 Clicks Daily Activity Score 20   Functional Mobility   Sit to Stand Supervised   Bed, Chair, Wheelchair Transfer Supervised   Transfer Method Stand Step   Mobility aournd room with FWW   Distance (Feet) 15   # of Times Distance was Traveled 1   Activity " Tolerance   Sitting in Chair 10 min +   Sitting Edge of Bed 20-25 min   Standing 2 min x3   Comments pt verbalized being exhausted from mornings events   Patient / Family Goals   Patient / Family Goal #1 home   Short Term Goals   Short Term Goal # 1 Pt will dress from seated with setup and AE as needed in 3 visits   Goal Outcome # 1 Progressing as expected   Short Term Goal # 2 Pt will tolerate 10 min standing for ADL's with SBA in 3 visits   Goal Outcome # 2 Progressing slower than expected   Short Term Goal # 3 Pt will toilet supervised in 3 visits  (reports sup with nursing, NT with OT)   Education Group   Education Provided Activities of Daily Living;Home Safety   Home Safety Patient Response Patient;Acceptance;Explanation;Verbal Demonstration   ADL Patient Response Patient;Acceptance;Explanation;Verbal Demonstration   Additional Comments see notes

## 2023-05-24 NOTE — THERAPY
Physical Therapy   Daily Treatment     Patient Name: Sarah Burger  Age:  77 y.o., Sex:  female  Medical Record #: [id number]  Today's Date: 5/24/2023     Precautions  Precautions: Fall Risk;Weight Bearing As Tolerated Left Lower Extremity    Assessment    Pt is safe with bed mob,transfers,ambulation and stirs.She understands HEP.Pt is safe for home    Plan    Treatment Plan Status:    Type of Treatment: Bed Mobility, Equipment, Gait Training, Manual Therapy, Neuro Re-Education / Balance, Self Care / Home Evaluation, Stair Training, Therapeutic Activities, Therapeutic Exercise  Treatment Frequency: Daily  Treatment Duration: Until Therapy Goals Met    DC Equipment Recommendations: Front-Wheel Walker  Discharge Recommendations: (P) Recommend outpatient physical therapy services to address higher level deficits     Objective       05/24/23 1100   Charge Group   PT Gait Training (Units) 1   PT Therapeutic Exercise (Units) 1   Passive ROM Lower Body   Passive ROM Lower Body X   Lt Knee Flexion Degrees 90   Lt Knee Extension Degrees 0   Supine Lower Body Exercise   Supine Lower Body Exercises Yes   Sitting Lower Body Exercises   Sitting Lower Body Exercises Yes   Home Exercise Program   Home Exercise Program Patient Able to Complete Home Program Independently, Safely   Balance   Sitting Balance (Static) Good   Sitting Balance (Dynamic) Good   Standing Balance (Static) Fair +   Standing Balance (Dynamic) Fair +   Weight Shift Sitting Good   Weight Shift Standing Good   Bed Mobility    Supine to Sit Modified Independent   Sit to Supine Modified Independent   Scooting Modified Independent   Gait Analysis   Gait Level Of Assist Supervised   Assistive Device Front Wheel Walker   Distance (Feet) 150   # of Times Distance was Traveled 1   Deviation Step To   # of Stairs Climbed 1   Level of Assist with Stairs Supervised   Functional Mobility   Sit to Stand Supervised   Bed, Chair, Wheelchair Transfer Supervised   Transfer  Method Stand Step   How much difficulty does the patient currently have...   Turning over in bed (including adjusting bedclothes, sheets and blankets)? 4   Sitting down on and standing up from a chair with arms (e.g., wheelchair, bedside commode, etc.) 4   Moving from lying on back to sitting on the side of the bed? 4   How much help from another person does the patient currently need...   Moving to and from a bed to a chair (including a wheelchair)? 4   Need to walk in a hospital room? 4   Climbing 3-5 steps with a railing? 4   6 clicks Mobility Score 24   Activity Tolerance   Sitting Edge of Bed 10   Standing 10   Short Term Goals    Short Term Goal # 1 pt will go supine<>sit w/hob flat and rails down w/Mod I in 6tx   Goal Outcome # 1 Goal met   Short Term Goal # 2 pt will go sit<>stand w/fww w/Mod I in 6tx   Goal Outcome # 2 Goal met   Short Term Goal # 3 pt will ambulate for 150ft w/fww w/Mod I in 6tx   Goal Outcome # 3 Goal met   Short Term Goal # 4 pt will go up/down 2 steps w/Mod I in 6tx   Goal Outcome # 4 Progressing as expected   Anticipated Discharge Equipment and Recommendations   Discharge Recommendations Recommend outpatient physical therapy services to address higher level deficits   Interdisciplinary Plan of Care Collaboration   IDT Collaboration with  Nursing   Session Information   Date / Session Number  5/24-2 2/7 5/30

## (undated) DEVICE — SUTURE 2-0 VICRYL PLUS CT-1 - 8 X 18 INCH(12/BX)

## (undated) DEVICE — GLOVE BIOGEL SZ 7.5 SURGICAL PF LTX - (50PR/BX 4BX/CA)

## (undated) DEVICE — SUCTION INSTRUMENT YANKAUER BULBOUS TIP W/O VENT (50EA/CA)

## (undated) DEVICE — TIP INTPLS HFLO ML ORFC BTRY - (12/CS)  FOR SURGILAV

## (undated) DEVICE — SUTURE GENERAL

## (undated) DEVICE — SUTURE 3-0 MONOCRYL PLUS PS-1 - 27 INCH (36/BX)

## (undated) DEVICE — PAD PREP 24 X 48 CUFFED - (100/CA)

## (undated) DEVICE — SPONGE GAUZE NON-STERILE 4X4 - (2000/CA 10PK/CA)

## (undated) DEVICE — DRAPE LARGE 3 QUARTER - (20/CA)

## (undated) DEVICE — DISPOSABLE WOUND VAC PICO 10 X 30 CM - WOUND CARE (3/CA)

## (undated) DEVICE — Device

## (undated) DEVICE — GLOVE BIOGEL SZ 8 SURGICAL PF LTX - (50PR/BX 4BX/CA)

## (undated) DEVICE — DRESSING STERILE BURN ACTICOAT FLEX 7 (50EA/CA)

## (undated) DEVICE — STOCKINETTE IMPERVIOUS 12X48 - STERILELF (10/CA)"

## (undated) DEVICE — HUMID-VENT HEAT AND MOISTURE EXCHANGE- (50/BX)

## (undated) DEVICE — CHLORAPREP 26 ML APPLICATOR - ORANGE TINT(25/CA)

## (undated) DEVICE — PACK TOTAL KNEE  (1/CA)

## (undated) DEVICE — CANISTER SUCTION RIGID RED 1500CC (40EA/CA)

## (undated) DEVICE — STAPLER SKIN DISP - (6/BX 10BX/CA) VISISTAT

## (undated) DEVICE — BLADE SAGITTAL SYSTEM 18MM

## (undated) DEVICE — LENS/HOOD FOR SPACESUIT - (32/PK) PEEL AWAY FACE

## (undated) DEVICE — SET EXTENSION WITH 2 PORTS (48EA/CA) ***PART #2C8610 IS A SUBSTITUTE*****

## (undated) DEVICE — TOWEL STOP TIMEOUT SAFETY FLAG (40EA/CA)

## (undated) DEVICE — TUBING CLEARLINK DUO-VENT - C-FLO (48EA/CA)

## (undated) DEVICE — BANDAGE ELASTIC STERILE VELCRO 6 X 5 YDS (25EA/CA)

## (undated) DEVICE — SODIUM CHL IRRIGATION 0.9% 1000ML (12EA/CA)

## (undated) DEVICE — HANDPIECE 10FT INTPLS SCT PLS IRRIGATION HAND CONTROL SET (6/PK)

## (undated) DEVICE — SENSOR OXIMETER ADULT SPO2 RD SET (20EA/BX)

## (undated) DEVICE — SUTURE 1 VICRYL PLUS CTX - 8 X 18 INCH (12/BX)

## (undated) DEVICE — PACK MAJOR ORTHO - (2EA/CA)

## (undated) DEVICE — GLOVE BIOGEL PI INDICATOR SZ 7.5 SURGICAL PF LF -(50/BX 4BX/CA)

## (undated) DEVICE — LACTATED RINGERS INJ 1000 ML - (14EA/CA 60CA/PF)

## (undated) DEVICE — COVER LIGHT HANDLE FLEXIBLE - SOFT (2EA/PK 80PK/CA)